# Patient Record
Sex: FEMALE | Race: WHITE | NOT HISPANIC OR LATINO
[De-identification: names, ages, dates, MRNs, and addresses within clinical notes are randomized per-mention and may not be internally consistent; named-entity substitution may affect disease eponyms.]

---

## 2017-01-03 ENCOUNTER — PRIOR ORIGINAL RECORDS (OUTPATIENT)
Dept: OTHER | Age: 67
End: 2017-01-03

## 2017-01-03 ENCOUNTER — HOSPITAL ENCOUNTER (OUTPATIENT)
Dept: MRI IMAGING | Facility: HOSPITAL | Age: 67
Discharge: HOME | End: 2017-01-03
Attending: INTERNAL MEDICINE
Payer: COMMERCIAL

## 2017-01-03 DIAGNOSIS — C79.51 BONE METASTASES (HCC): ICD-10-CM

## 2017-01-03 DIAGNOSIS — C50.211 MALIGNANT NEOPLASM OF UPPER-INNER QUADRANT OF RIGHT FEMALE BREAST (HCC): ICD-10-CM

## 2017-01-03 PROCEDURE — 72157 MRI CHEST SPINE W/O & W/DYE: CPT

## 2017-01-03 PROCEDURE — A9575 INJ GADOTERATE MEGLUMI 0.1ML: HCPCS | Performed by: INTERNAL MEDICINE

## 2017-01-03 PROCEDURE — 72158 MRI LUMBAR SPINE W/O & W/DYE: CPT

## 2017-01-09 ENCOUNTER — PRIOR ORIGINAL RECORDS (OUTPATIENT)
Dept: OTHER | Age: 67
End: 2017-01-09

## 2017-01-16 ENCOUNTER — PRIOR ORIGINAL RECORDS (OUTPATIENT)
Dept: OTHER | Age: 67
End: 2017-01-16

## 2017-01-23 ENCOUNTER — PRIOR ORIGINAL RECORDS (OUTPATIENT)
Dept: OTHER | Age: 67
End: 2017-01-23

## 2017-01-23 ENCOUNTER — APPOINTMENT (OUTPATIENT)
Dept: GENERAL RADIOLOGY | Facility: HOSPITAL | Age: 67
DRG: 597 | End: 2017-01-23
Attending: EMERGENCY MEDICINE
Payer: COMMERCIAL

## 2017-01-23 ENCOUNTER — APPOINTMENT (OUTPATIENT)
Dept: CT IMAGING | Facility: HOSPITAL | Age: 67
DRG: 597 | End: 2017-01-23
Attending: EMERGENCY MEDICINE
Payer: COMMERCIAL

## 2017-01-23 ENCOUNTER — HOSPITAL ENCOUNTER (INPATIENT)
Facility: HOSPITAL | Age: 67
LOS: 2 days | Discharge: HOME | DRG: 597 | End: 2017-01-26
Attending: EMERGENCY MEDICINE | Admitting: FAMILY MEDICINE
Payer: COMMERCIAL

## 2017-01-23 DIAGNOSIS — J18.9 COMMUNITY ACQUIRED PNEUMONIA: Primary | ICD-10-CM

## 2017-01-23 LAB
ALBUMIN SERPL-MCNC: 3.2 G/DL (ref 3.5–4.8)
ALP LIVER SERPL-CCNC: 58 U/L (ref 55–142)
ALT SERPL-CCNC: 49 U/L (ref 14–54)
AST SERPL-CCNC: 42 U/L (ref 15–41)
BASOPHILS # BLD AUTO: 0.02 X10(3) UL (ref 0–0.1)
BASOPHILS NFR BLD AUTO: 0.3 %
BILIRUB SERPL-MCNC: 0.5 MG/DL (ref 0.1–2)
BUN BLD-MCNC: 13 MG/DL (ref 8–20)
CALCIUM BLD-MCNC: 8.4 MG/DL (ref 8.3–10.3)
CHLORIDE: 102 MMOL/L (ref 101–111)
CO2: 25 MMOL/L (ref 22–32)
CREAT BLD-MCNC: 0.75 MG/DL (ref 0.55–1.02)
EOSINOPHIL # BLD AUTO: 0.03 X10(3) UL (ref 0–0.3)
EOSINOPHIL NFR BLD AUTO: 0.4 %
ERYTHROCYTE [DISTWIDTH] IN BLOOD BY AUTOMATED COUNT: 22.5 % (ref 11.5–16)
GLUCOSE BLD-MCNC: 103 MG/DL (ref 70–99)
HCT VFR BLD AUTO: 30.5 % (ref 34–50)
HGB BLD-MCNC: 9.8 G/DL (ref 12–16)
IMMATURE GRANULOCYTE COUNT: 0.05 X10(3) UL (ref 0–1)
IMMATURE GRANULOCYTE RATIO %: 0.7 %
INR BLD: 3 (ref 0.89–1.12)
LYMPHOCYTES # BLD AUTO: 0.52 X10(3) UL (ref 0.9–4)
LYMPHOCYTES NFR BLD AUTO: 7.2 %
M PROTEIN MFR SERPL ELPH: 6.6 G/DL (ref 6.1–8.3)
MCH RBC QN AUTO: 27.6 PG (ref 27–33.2)
MCHC RBC AUTO-ENTMCNC: 32.1 G/DL (ref 31–37)
MCV RBC AUTO: 85.9 FL (ref 81–100)
MONOCYTES # BLD AUTO: 0.66 X10(3) UL (ref 0.1–0.6)
MONOCYTES NFR BLD AUTO: 9.1 %
NEUTROPHIL ABS PRELIM: 5.98 X10 (3) UL (ref 1.3–6.7)
NEUTROPHILS # BLD AUTO: 5.98 X10(3) UL (ref 1.3–6.7)
NEUTROPHILS NFR BLD AUTO: 82.3 %
PLATELET # BLD AUTO: 390 10(3)UL (ref 150–450)
PLATELET MORPHOLOGY: NORMAL
POTASSIUM SERPL-SCNC: 3.2 MMOL/L (ref 3.6–5.1)
PRO-BETA NATRIURETIC PEPTIDE: 7471 PG/ML (ref ?–125)
PSA SERPL DL<=0.01 NG/ML-MCNC: 32.3 SECONDS (ref 12.3–14.8)
RBC # BLD AUTO: 3.55 X10(6)UL (ref 3.8–5.1)
RED CELL DISTRIBUTION WIDTH-SD: 66.3 FL (ref 35.1–46.3)
SODIUM SERPL-SCNC: 137 MMOL/L (ref 136–144)
TROPONIN: <0.046 NG/ML (ref ?–0.05)
WBC # BLD AUTO: 7.3 X10(3) UL (ref 4–13)

## 2017-01-23 PROCEDURE — 71275 CT ANGIOGRAPHY CHEST: CPT

## 2017-01-23 PROCEDURE — 87040 BLOOD CULTURE FOR BACTERIA: CPT | Performed by: EMERGENCY MEDICINE

## 2017-01-23 PROCEDURE — 99285 EMERGENCY DEPT VISIT HI MDM: CPT

## 2017-01-23 PROCEDURE — 96375 TX/PRO/DX INJ NEW DRUG ADDON: CPT

## 2017-01-23 PROCEDURE — 96374 THER/PROPH/DIAG INJ IV PUSH: CPT

## 2017-01-23 PROCEDURE — 93010 ELECTROCARDIOGRAM REPORT: CPT | Performed by: INTERNAL MEDICINE

## 2017-01-23 PROCEDURE — 94645 CONT INHLJ TX EACH ADDL HOUR: CPT

## 2017-01-23 PROCEDURE — 93010 ELECTROCARDIOGRAM REPORT: CPT

## 2017-01-23 PROCEDURE — 93005 ELECTROCARDIOGRAM TRACING: CPT

## 2017-01-23 PROCEDURE — 80053 COMPREHEN METABOLIC PANEL: CPT | Performed by: EMERGENCY MEDICINE

## 2017-01-23 PROCEDURE — 85610 PROTHROMBIN TIME: CPT | Performed by: EMERGENCY MEDICINE

## 2017-01-23 PROCEDURE — 83880 ASSAY OF NATRIURETIC PEPTIDE: CPT | Performed by: EMERGENCY MEDICINE

## 2017-01-23 PROCEDURE — 85025 COMPLETE CBC W/AUTO DIFF WBC: CPT | Performed by: EMERGENCY MEDICINE

## 2017-01-23 PROCEDURE — 84484 ASSAY OF TROPONIN QUANT: CPT | Performed by: EMERGENCY MEDICINE

## 2017-01-23 PROCEDURE — 94644 CONT INHLJ TX 1ST HOUR: CPT

## 2017-01-23 PROCEDURE — 36415 COLL VENOUS BLD VENIPUNCTURE: CPT

## 2017-01-23 PROCEDURE — 71010 XR CHEST AP PORTABLE  (CPT=71010): CPT

## 2017-01-23 RX ORDER — POTASSIUM CHLORIDE 20 MEQ/1
40 TABLET, EXTENDED RELEASE ORAL ONCE
Status: COMPLETED | OUTPATIENT
Start: 2017-01-23 | End: 2017-01-23

## 2017-01-23 RX ORDER — WARFARIN SODIUM 7.5 MG/1
7.5 TABLET ORAL AS DIRECTED
COMMUNITY

## 2017-01-23 RX ORDER — FUROSEMIDE 10 MG/ML
20 INJECTION INTRAMUSCULAR; INTRAVENOUS ONCE
Status: COMPLETED | OUTPATIENT
Start: 2017-01-23 | End: 2017-01-23

## 2017-01-23 RX ORDER — WARFARIN SODIUM 10 MG/1
10 TABLET ORAL
COMMUNITY

## 2017-01-23 RX ORDER — METHYLPREDNISOLONE SODIUM SUCCINATE 125 MG/2ML
125 INJECTION, POWDER, LYOPHILIZED, FOR SOLUTION INTRAMUSCULAR; INTRAVENOUS ONCE
Status: COMPLETED | OUTPATIENT
Start: 2017-01-23 | End: 2017-01-23

## 2017-01-24 ENCOUNTER — APPOINTMENT (OUTPATIENT)
Dept: CV DIAGNOSTICS | Facility: HOSPITAL | Age: 67
DRG: 597 | End: 2017-01-24
Attending: INTERNAL MEDICINE
Payer: COMMERCIAL

## 2017-01-24 LAB
ATRIAL RATE: 105 BPM
ATRIAL RATE: 52 BPM
ATRIAL RATE: 54 BPM
ATRIAL RATE: 56 BPM
BASOPHILS # BLD AUTO: 0.01 X10(3) UL (ref 0–0.1)
BASOPHILS NFR BLD AUTO: 0.1 %
BUN BLD-MCNC: 11 MG/DL (ref 8–20)
CALCIUM BLD-MCNC: 8.4 MG/DL (ref 8.3–10.3)
CHLORIDE: 103 MMOL/L (ref 101–111)
CO2: 24 MMOL/L (ref 22–32)
CREAT BLD-MCNC: 0.89 MG/DL (ref 0.55–1.02)
EOSINOPHIL # BLD AUTO: 0 X10(3) UL (ref 0–0.3)
EOSINOPHIL NFR BLD AUTO: 0 %
ERYTHROCYTE [DISTWIDTH] IN BLOOD BY AUTOMATED COUNT: 22.6 % (ref 11.5–16)
GLUCOSE BLD-MCNC: 157 MG/DL (ref 70–99)
HCT VFR BLD AUTO: 31.2 % (ref 34–50)
HGB BLD-MCNC: 9.8 G/DL (ref 12–16)
IMMATURE GRANULOCYTE COUNT: 0.05 X10(3) UL (ref 0–1)
IMMATURE GRANULOCYTE RATIO %: 0.6 %
INR BLD: 2.77 (ref 0.89–1.12)
LYMPHOCYTES # BLD AUTO: 0.27 X10(3) UL (ref 0.9–4)
LYMPHOCYTES NFR BLD AUTO: 3.1 %
MCH RBC QN AUTO: 27.3 PG (ref 27–33.2)
MCHC RBC AUTO-ENTMCNC: 31.4 G/DL (ref 31–37)
MCV RBC AUTO: 86.9 FL (ref 81–100)
MONOCYTES # BLD AUTO: 0.16 X10(3) UL (ref 0.1–0.6)
MONOCYTES NFR BLD AUTO: 1.8 %
NEUTROPHIL ABS PRELIM: 8.22 X10 (3) UL (ref 1.3–6.7)
NEUTROPHILS # BLD AUTO: 8.22 X10(3) UL (ref 1.3–6.7)
NEUTROPHILS NFR BLD AUTO: 94.4 %
P AXIS: 56 DEGREES
P AXIS: 59 DEGREES
P AXIS: 65 DEGREES
P AXIS: 76 DEGREES
P-R INTERVAL: 128 MS
P-R INTERVAL: 144 MS
P-R INTERVAL: 180 MS
PLATELET # BLD AUTO: 430 10(3)UL (ref 150–450)
POTASSIUM SERPL-SCNC: 3.8 MMOL/L (ref 3.6–5.1)
PRO-BETA NATRIURETIC PEPTIDE: 7082 PG/ML (ref ?–125)
PROCALCITONIN SERPL-MCNC: <0.11 NG/ML (ref ?–0.11)
PSA SERPL DL<=0.01 NG/ML-MCNC: 30.3 SECONDS (ref 12.3–14.8)
Q-T INTERVAL: 580 MS
Q-T INTERVAL: 582 MS
Q-T INTERVAL: 598 MS
Q-T INTERVAL: 604 MS
QRS DURATION: 90 MS
QRS DURATION: 92 MS
QTC CALCULATION (BEZET): 541 MS
QTC CALCULATION (BEZET): 550 MS
QTC CALCULATION (BEZET): 582 MS
QTC CALCULATION (BEZET): 598 MS
R AXIS: 22 DEGREES
R AXIS: 37 DEGREES
R AXIS: 37 DEGREES
R AXIS: 38 DEGREES
RBC # BLD AUTO: 3.59 X10(6)UL (ref 3.8–5.1)
RED CELL DISTRIBUTION WIDTH-SD: 67.2 FL (ref 35.1–46.3)
SODIUM SERPL-SCNC: 138 MMOL/L (ref 136–144)
T AXIS: 40 DEGREES
T AXIS: 55 DEGREES
T AXIS: 60 DEGREES
T AXIS: 66 DEGREES
TROPONIN: <0.046 NG/ML (ref ?–0.05)
VENTRICULAR RATE: 52 BPM
VENTRICULAR RATE: 54 BPM
VENTRICULAR RATE: 56 BPM
VENTRICULAR RATE: 60 BPM
WBC # BLD AUTO: 8.7 X10(3) UL (ref 4–13)

## 2017-01-24 PROCEDURE — 94640 AIRWAY INHALATION TREATMENT: CPT

## 2017-01-24 PROCEDURE — 80299 QUANTITATIVE ASSAY DRUG: CPT | Performed by: INTERNAL MEDICINE

## 2017-01-24 PROCEDURE — 93010 ELECTROCARDIOGRAM REPORT: CPT | Performed by: INTERNAL MEDICINE

## 2017-01-24 PROCEDURE — 87581 M.PNEUMON DNA AMP PROBE: CPT | Performed by: INTERNAL MEDICINE

## 2017-01-24 PROCEDURE — 87449 NOS EACH ORGANISM AG IA: CPT | Performed by: INTERNAL MEDICINE

## 2017-01-24 PROCEDURE — 85025 COMPLETE CBC W/AUTO DIFF WBC: CPT | Performed by: FAMILY MEDICINE

## 2017-01-24 PROCEDURE — 93005 ELECTROCARDIOGRAM TRACING: CPT

## 2017-01-24 PROCEDURE — 94664 DEMO&/EVAL PT USE INHALER: CPT

## 2017-01-24 PROCEDURE — 86738 MYCOPLASMA ANTIBODY: CPT | Performed by: INTERNAL MEDICINE

## 2017-01-24 PROCEDURE — 84145 PROCALCITONIN (PCT): CPT | Performed by: INTERNAL MEDICINE

## 2017-01-24 PROCEDURE — 87999 UNLISTED MICROBIOLOGY PX: CPT

## 2017-01-24 PROCEDURE — 87486 CHLMYD PNEUM DNA AMP PROBE: CPT | Performed by: INTERNAL MEDICINE

## 2017-01-24 PROCEDURE — 84484 ASSAY OF TROPONIN QUANT: CPT | Performed by: INTERNAL MEDICINE

## 2017-01-24 PROCEDURE — 80048 BASIC METABOLIC PNL TOTAL CA: CPT | Performed by: FAMILY MEDICINE

## 2017-01-24 PROCEDURE — 85610 PROTHROMBIN TIME: CPT | Performed by: FAMILY MEDICINE

## 2017-01-24 PROCEDURE — 86713 LEGIONELLA ANTIBODY: CPT | Performed by: INTERNAL MEDICINE

## 2017-01-24 PROCEDURE — 93306 TTE W/DOPPLER COMPLETE: CPT

## 2017-01-24 PROCEDURE — 93306 TTE W/DOPPLER COMPLETE: CPT | Performed by: INTERNAL MEDICINE

## 2017-01-24 PROCEDURE — 87798 DETECT AGENT NOS DNA AMP: CPT | Performed by: INTERNAL MEDICINE

## 2017-01-24 PROCEDURE — 87633 RESP VIRUS 12-25 TARGETS: CPT | Performed by: INTERNAL MEDICINE

## 2017-01-24 PROCEDURE — 83880 ASSAY OF NATRIURETIC PEPTIDE: CPT | Performed by: INTERNAL MEDICINE

## 2017-01-24 RX ORDER — LEVOTHYROXINE SODIUM 0.05 MG/1
50 TABLET ORAL
Status: DISCONTINUED | OUTPATIENT
Start: 2017-01-24 | End: 2017-01-26

## 2017-01-24 RX ORDER — ACETAMINOPHEN 500 MG
500 TABLET ORAL EVERY 6 HOURS PRN
Status: DISCONTINUED | OUTPATIENT
Start: 2017-01-24 | End: 2017-01-26

## 2017-01-24 RX ORDER — IPRATROPIUM BROMIDE AND ALBUTEROL SULFATE 2.5; .5 MG/3ML; MG/3ML
3 SOLUTION RESPIRATORY (INHALATION)
Status: DISCONTINUED | OUTPATIENT
Start: 2017-01-24 | End: 2017-01-26

## 2017-01-24 RX ORDER — HALOPERIDOL 5 MG
5 TABLET ORAL NIGHTLY
Status: DISCONTINUED | OUTPATIENT
Start: 2017-01-24 | End: 2017-01-26

## 2017-01-24 RX ORDER — ONDANSETRON 2 MG/ML
4 INJECTION INTRAMUSCULAR; INTRAVENOUS EVERY 4 HOURS PRN
Status: DISCONTINUED | OUTPATIENT
Start: 2017-01-24 | End: 2017-01-26

## 2017-01-24 RX ORDER — MULTIPLE VITAMINS W/ MINERALS TAB 9MG-400MCG
1 TAB ORAL DAILY
Status: DISCONTINUED | OUTPATIENT
Start: 2017-01-24 | End: 2017-01-26

## 2017-01-24 RX ORDER — BENZTROPINE MESYLATE 0.5 MG/1
1 TABLET ORAL DAILY PRN
Status: DISCONTINUED | OUTPATIENT
Start: 2017-01-24 | End: 2017-01-26

## 2017-01-24 RX ORDER — ACETAMINOPHEN 160 MG
2000 TABLET,DISINTEGRATING ORAL DAILY
Status: DISCONTINUED | OUTPATIENT
Start: 2017-01-24 | End: 2017-01-26

## 2017-01-24 RX ORDER — FUROSEMIDE 10 MG/ML
40 INJECTION INTRAMUSCULAR; INTRAVENOUS ONCE
Status: COMPLETED | OUTPATIENT
Start: 2017-01-24 | End: 2017-01-24

## 2017-01-24 RX ORDER — HALOPERIDOL 5 MG
10 TABLET ORAL NIGHTLY
Status: DISCONTINUED | OUTPATIENT
Start: 2017-01-24 | End: 2017-01-24

## 2017-01-24 NOTE — ED NOTES
Full report given to [address] RN, pt stable at this time. Pt receiving hour long neb treatment. RN informed that zithromax has not yet been infused.

## 2017-01-24 NOTE — PLAN OF CARE
NURSING ADMISSION NOTE      Patient admitted via Cart  Oriented to room. 532  Safety precautions initiated. Bed in low position. Call light in reach.     DISCHARGE PLANNING    • Discharge to home or other facility with appropriate resources Progress

## 2017-01-24 NOTE — ED PROVIDER NOTES
Patient Seen in: [facility] Emergency Department    History   Patient presents with:  Dyspnea LILIANA SOB (respiratory)    Stated Complaint: liliana r/o pe    HPI    This is a 58-year-old female who arrives here with complaints of shortness of breath the pa Monday, Wednesday, Friday   Warfarin Sodium 7.5 MG Oral Tab,  Take 7.5 mg by mouth As Directed. Sunday, Tuesday, Thursday, Saturday   haloperidol (HALDOL) 10 MG Oral Tab,  Take 10 mg by mouth nightly.    acetaminophen (TYLENOL EXTRA STRENGTH) 500 MG Oral Ta Temp(Src) 97.9 °F (36.6 °C) (Oral)  Resp 20  Ht 160 cm (5' 3\")  Wt 100.245 kg  BMI 39.16 kg/m2  SpO2 96%        Physical Exam    General: . The patient is in moderate respiratory distress. HEENT: Atraumatic, conjunctiva are not pale.   There is no i CBC WITH DIFFERENTIAL WITH PLATELET.   Procedure                               Abnormality         Status                     ---------                               -----------         ------                     CBC W/ DIFFERENTIAL[[id number]]          Abno prominence to the interlobular septa in the upper lungs primarily in the differential would include lymphangitic spread of metastases versus CHF and interstitial edema. 2. No evidence of pulmonary embolus.   3. Stable diffuse osteoblastic metastases.

## 2017-01-24 NOTE — PAYOR COMM NOTE
Attending Physician: Shane Hernandez MD    Review Type: ADMISSION   Reviewer: Concetta PRO       Date: January 24, 2017 - 10:16 AM  Payor: [employer] O  Authorization Number: N/A  Admit date: 1/23/2017  6:50 PM   Admitted from Emergency wheezing.              MEDICATIONS ADMINISTERED IN LAST 1 DAY:  albuterol Sulfate (VENTOLIN) (5 MG/ML) 0.5% nebulizer solution 10 mg     Date Action Dose Route User    1/23/2017 2011 New Bag 10 mg Nebulization Mendy Jones    1/23/2017 1915 New Bag 10 mg Ne Action Dose Route User    1/23/2017 2252 Given 40 mEq Oral Cherylene Hitt, RN      Vitamin D3 (VITAMIN D3) cap 2,000 Units     Date Action Dose Route User    1/24/2017 8305 Given 2000 Units Oral Vinh REAGAN Stone          RESULTS LAST 24HRS:  Labs Honorio Gastelum edema. Slight blunting of the left CP angle. CT ANGIOGRAM OF THE CHEST : 1. Compared to the previous study, there is development of moderate-sized bilateral posterior pleural effusions with new passive atelectasis of portions of the lung bases.  There ar

## 2017-01-24 NOTE — CONSULTS
Kolby Aburto [address] [facility]  Pulmonary/Critical Care Consult Note  [facility]  Report of Consultation    Vermaya Garsia Patient Status:  Inpatient    1950 MRN [id number]   [facility] 5NW-A Attending Melissa Chaparro Allergies    Medications:  • haloperidol  10 mg Oral Nightly   • Levothyroxine Sodium  50 mcg Oral Before breakfast   • multivitamin with minerals  1 tablet Oral Daily   • Vitamin D3  2,000 Units Oral Daily   • ipratropium-albuterol  3 mL Nebulization 6 ti 93 % - -   01/23/17 2206 152/74 mmHg - - 61 20 94 % - -   01/23/17 2106 143/64 mmHg - - 54 20 95 % - -   01/23/17 2007 160/75 mmHg - - 57 21 100 % - -   01/23/17 1836 153/83 mmHg 97.8 °F (36.6 °C) Temporal 60 (!) 36 96 % 5' 3\" (1.6 m) 220 lb (99.791 kg) No results found for this visit on 01/23/17. No results for input(s): Yvan Flores, [address], [address], [address], [address], [address] [facility], [address], [address], [address], NITRITE, LEUUR, WBCUR, RBCUR, Shona Facundo in the last 72 hours.     Radiology:     Ct A

## 2017-01-24 NOTE — CONSULTS
[facility]  Report of Consultation    Lyn Espitia Patient Status:  Observation    1950 MRN [id number]   [facility] 5NW-A Attending Ethan Euceda MD   Hosp Day # 1 PCP Vandana Conrad MD     Reason for Consultation:  Rishi Melgar bone.  She was then started on weekly vinorelbine. Her disease was initially stable. Over the last month or 2, there is been a progressive rise in her tumor marker.   A CT scan of the chest abdomen and pelvis in December showed no evidence for soft tissue never used smokeless tobacco. She reports that she does not drink alcohol or use illicit drugs.     Allergies:  No Known Allergies    Medications:    Current facility-administered medications:   •  ondansetron HCl (ZOFRAN) injection 4 mg, 4 mg, Intravenous, This is unchanged from baseline. Muscle strength is 5+/5+ symmetrically   Lymphatics: There is no palpable lymphadenopathy throughout in the cervical,            supraclavicular, axillary, or inguinal regions. Psych/Depression: He is in good spirits.   H

## 2017-01-24 NOTE — ED INITIAL ASSESSMENT (HPI)
Pt c/o progressing SOB since Wednesday. Seen by oncologist today and sent for CT to R/O PE. Denies chest pain, fever.   C/O sob w/ exertion and productive cough

## 2017-01-25 ENCOUNTER — APPOINTMENT (OUTPATIENT)
Dept: ULTRASOUND IMAGING | Facility: HOSPITAL | Age: 67
DRG: 597 | End: 2017-01-25
Attending: INTERNAL MEDICINE
Payer: COMMERCIAL

## 2017-01-25 ENCOUNTER — APPOINTMENT (OUTPATIENT)
Dept: GENERAL RADIOLOGY | Facility: HOSPITAL | Age: 67
DRG: 597 | End: 2017-01-25
Attending: RADIOLOGY
Payer: COMMERCIAL

## 2017-01-25 LAB
ERYTHROCYTE [DISTWIDTH] IN BLOOD BY AUTOMATED COUNT: 22.8 % (ref 11.5–16)
HCT VFR BLD AUTO: 27.2 % (ref 34–50)
HGB BLD-MCNC: 9 G/DL (ref 12–16)
INR BLD: 1.54 (ref 0.89–1.12)
MCH RBC QN AUTO: 28 PG (ref 27–33.2)
MCHC RBC AUTO-ENTMCNC: 33.1 G/DL (ref 31–37)
MCV RBC AUTO: 84.5 FL (ref 81–100)
PLATELET # BLD AUTO: 394 10(3)UL (ref 150–450)
PSA SERPL DL<=0.01 NG/ML-MCNC: 19 SECONDS (ref 12.3–14.8)
RBC # BLD AUTO: 3.22 X10(6)UL (ref 3.8–5.1)
RED CELL DISTRIBUTION WIDTH-SD: 66.8 FL (ref 35.1–46.3)
WBC # BLD AUTO: 9.5 X10(3) UL (ref 4–13)

## 2017-01-25 PROCEDURE — 87205 SMEAR GRAM STAIN: CPT | Performed by: FAMILY MEDICINE

## 2017-01-25 PROCEDURE — 87101 SKIN FUNGI CULTURE: CPT | Performed by: INTERNAL MEDICINE

## 2017-01-25 PROCEDURE — 87206 SMEAR FLUORESCENT/ACID STAI: CPT | Performed by: INTERNAL MEDICINE

## 2017-01-25 PROCEDURE — 88305 TISSUE EXAM BY PATHOLOGIST: CPT | Performed by: FAMILY MEDICINE

## 2017-01-25 PROCEDURE — 94640 AIRWAY INHALATION TREATMENT: CPT

## 2017-01-25 PROCEDURE — 89050 BODY FLUID CELL COUNT: CPT | Performed by: INTERNAL MEDICINE

## 2017-01-25 PROCEDURE — 88342 IMHCHEM/IMCYTCHM 1ST ANTB: CPT | Performed by: FAMILY MEDICINE

## 2017-01-25 PROCEDURE — 87206 SMEAR FLUORESCENT/ACID STAI: CPT | Performed by: FAMILY MEDICINE

## 2017-01-25 PROCEDURE — 83615 LACTATE (LD) (LDH) ENZYME: CPT | Performed by: INTERNAL MEDICINE

## 2017-01-25 PROCEDURE — 87116 MYCOBACTERIA CULTURE: CPT | Performed by: FAMILY MEDICINE

## 2017-01-25 PROCEDURE — 88108 CYTOPATH CONCENTRATE TECH: CPT | Performed by: FAMILY MEDICINE

## 2017-01-25 PROCEDURE — 82945 GLUCOSE OTHER FLUID: CPT | Performed by: INTERNAL MEDICINE

## 2017-01-25 PROCEDURE — 88341 IMHCHEM/IMCYTCHM EA ADD ANTB: CPT | Performed by: FAMILY MEDICINE

## 2017-01-25 PROCEDURE — 71010 XR CHEST AP PORTABLE  (CPT=71010): CPT

## 2017-01-25 PROCEDURE — 84157 ASSAY OF PROTEIN OTHER: CPT | Performed by: INTERNAL MEDICINE

## 2017-01-25 PROCEDURE — 85610 PROTHROMBIN TIME: CPT | Performed by: INTERNAL MEDICINE

## 2017-01-25 PROCEDURE — 85027 COMPLETE CBC AUTOMATED: CPT | Performed by: INTERNAL MEDICINE

## 2017-01-25 PROCEDURE — 0W9B3ZZ DRAINAGE OF LEFT PLEURAL CAVITY, PERCUTANEOUS APPROACH: ICD-10-PCS | Performed by: RADIOLOGY

## 2017-01-25 PROCEDURE — 87070 CULTURE OTHR SPECIMN AEROBIC: CPT | Performed by: FAMILY MEDICINE

## 2017-01-25 PROCEDURE — 32555 ASPIRATE PLEURA W/ IMAGING: CPT

## 2017-01-25 PROCEDURE — 89051 BODY FLUID CELL COUNT: CPT | Performed by: INTERNAL MEDICINE

## 2017-01-25 RX ORDER — ENOXAPARIN SODIUM 100 MG/ML
40 INJECTION SUBCUTANEOUS NIGHTLY
Status: DISCONTINUED | OUTPATIENT
Start: 2017-01-25 | End: 2017-01-26

## 2017-01-25 RX ORDER — FUROSEMIDE 10 MG/ML
20 INJECTION INTRAMUSCULAR; INTRAVENOUS ONCE
Status: COMPLETED | OUTPATIENT
Start: 2017-01-25 | End: 2017-01-25

## 2017-01-25 NOTE — PROCEDURES
[facility]  Procedure Note    Ricarda Gilford Patient Status:  Inpatient    1950 MRN [id number]   [facility] 5NW-A Attending Karen Qiu MD   [facility] Day # 2 PCP Henry Son MD     Procedure: Left thoracentesis    Pre-

## 2017-01-25 NOTE — PROGRESS NOTES
[facility]  Progress Note    Jacqueline Smith Patient Status:  Inpatient    1950 MRN [id number]   [facility] 5NW-A Attending Danny Velazquez MD   [facility] Day # 2 PCP Arron Russell MD     Subjective:  Jacqueline Smith is a(n)

## 2017-01-25 NOTE — PROGRESS NOTES
Received patient back form thora to left lung. Dressing to incision is intact, small amount of old bloody drainage. Lung sounds present.

## 2017-01-25 NOTE — PROGRESS NOTES
01/24/17 2230   Provider Notification   Reason for Communication Order clarification   Provider Name Dr. Zaida Molina of Communication Page   Response Waiting for response;See orders   Pt takes haldol 5 mg at night. 10mg ordered.  Okay to order just

## 2017-01-25 NOTE — CM/SW NOTE
01/25/17 1600   CM/SW Screening   Referral [facility] staff; Chart review;Nursing rounds   Patient's Mental Status Alert;Oriented   Patient lives with Spouse   Patient Status Prior to Admission   Independent with ADLs an

## 2017-01-25 NOTE — PROGRESS NOTES
[facility]  Progress Note    Lenard Dupont Patient Status:  Inpatient    1950 MRN [id number]   [facility] 5NW-A Attending Kishan Rodriguez MD   [facility] Day # 2 PCP Marian Lucio MD     Subjective:  Ketansantana Dupont is a(n) thoracentesis without PTX  Chest CT 1/25- volume overload and moderate bilateral pleural effusions. New reticulonodular opacities in the upper lobes.   +osteoblastic lesions    Medications Reviewed:    Current Facility-Administered Medications:  CefTRIAXon

## 2017-01-25 NOTE — PLAN OF CARE
Pt is A&Ox4. RA. . VSS. Denies pain. Reports some SOB and a productive cough. Meds as ordered. Fluids as TKo through port. Pt is up self to bathroom. All needs met. Pt is resting in bed at this time. Will monitor.

## 2017-01-25 NOTE — PROGRESS NOTES
Pharmacy Note:   Antimicrobial Weight Dose Adjustment for: Rocephin    Nereida Greenberg has been prescribed Rocephin 1 gram IV daily. Estimated Creatinine Clearance: 50.7 mL/min (based on Cr of 0.89).           Weight = 100.4 kg          BMI =

## 2017-01-26 VITALS
RESPIRATION RATE: 16 BRPM | BODY MASS INDEX: 39.21 KG/M2 | WEIGHT: 221.31 LBS | SYSTOLIC BLOOD PRESSURE: 141 MMHG | OXYGEN SATURATION: 95 % | HEART RATE: 110 BPM | HEIGHT: 63 IN | TEMPERATURE: 98 F | DIASTOLIC BLOOD PRESSURE: 85 MMHG

## 2017-01-26 LAB
BASOPHIL PLEURAL FLUID: 0 %
EOSINOPHIL PLEURAL FLUID: 0 %
ERYTHROCYTE [DISTWIDTH] IN BLOOD BY AUTOMATED COUNT: 23 % (ref 11.5–16)
GLUCOSE PLEURAL FLUID: 108 MG/DL
HCT VFR BLD AUTO: 28.4 % (ref 34–50)
HGB BLD-MCNC: 9.4 G/DL (ref 12–16)
LDH PLEURAL FLUID: 122 U/L
LEGIONELLA PNEUMOPHILA AG, URI: NEGATIVE
LYMPHOCYTE PLEURAL FLUID: 24 %
MCH RBC QN AUTO: 28.1 PG (ref 27–33.2)
MCHC RBC AUTO-ENTMCNC: 33.1 G/DL (ref 31–37)
MCV RBC AUTO: 85 FL (ref 81–100)
MESOTHELIAL PLEURAL FLUID: 38 %
MONO/MAC/HISTIO PLEURAL FLUID: 22 %
MYCOPLASMA PNEUMONIAE AB, IGM: 0.06 U/L
NEUTROPHIL PLEURAL FLUID: 16 %
PLATELET # BLD AUTO: 440 10(3)UL (ref 150–450)
RBC # BLD AUTO: 3.34 X10(6)UL (ref 3.8–5.1)
RBC PLEURAL FLUID: <3000 /MM3
RED CELL DISTRIBUTION WIDTH-SD: 67.8 FL (ref 35.1–46.3)
STREPTOCOCCUS PNEUMONIAE AG, U: NEGATIVE
TOTAL CELLS COUNTED: 100
TOTAL PROTEIN PLEURAL FLUID: 2.8 G/DL
WBC # BLD AUTO: 7.2 X10(3) UL (ref 4–13)
WBC PLEURAL FLUID: 994 /MM3

## 2017-01-26 PROCEDURE — 85027 COMPLETE CBC AUTOMATED: CPT | Performed by: INTERNAL MEDICINE

## 2017-01-26 PROCEDURE — 94640 AIRWAY INHALATION TREATMENT: CPT

## 2017-01-26 PROCEDURE — 94664 DEMO&/EVAL PT USE INHALER: CPT

## 2017-01-26 RX ORDER — FUROSEMIDE 10 MG/ML
20 INJECTION INTRAMUSCULAR; INTRAVENOUS DAILY
Status: DISCONTINUED | OUTPATIENT
Start: 2017-01-26 | End: 2017-01-26

## 2017-01-26 RX ORDER — FUROSEMIDE 40 MG/1
40 TABLET ORAL DAILY
Qty: 30 TABLET | Refills: 0 | Status: SHIPPED | OUTPATIENT
Start: 2017-01-26 | End: 2017-04-29

## 2017-01-26 RX ORDER — HALOPERIDOL 5 MG
5 TABLET ORAL NIGHTLY
Qty: 30 TABLET | Refills: 0 | Status: SHIPPED | OUTPATIENT
Start: 2017-01-26

## 2017-01-26 NOTE — PROGRESS NOTES
[facility]  Progress Note    Aileen Puentes Patient Status:  Inpatient    1950 MRN [id number]   [facility] 5NW-A Attending Albaro Mo MD   [facility] Day # 3 PCP Tanya Sanchez MD     Subjective:  Aileen Dhaval is a(n)

## 2017-01-26 NOTE — PLAN OF CARE
DISCHARGE PLANNING    • Discharge to home or other facility with appropriate resources Adequate for Discharge        PAIN - ADULT    • Verbalizes/displays adequate comfort level or patient's stated pain goal Adequate for Discharge        Patient/Family [place]

## 2017-01-26 NOTE — PROGRESS NOTES
[address] Patient Status:  Inpatient    1950 MRN [id number]   [facility] 5NW-A Attending Alyse Sarkar MD   [address] Day # 2 PCP Sim Mcnamara MD       History and Physical      Chief Complaint:  Patient admission:  Warfarin Sodium 10 MG Oral Tab Take 10 mg by mouth 3 (three) times a week. Monday, Wednesday, Friday Disp:  Rfl:  Past Week at Unknown time   Warfarin Sodium 7.5 MG Oral Tab Take 7.5 mg by mouth As Directed.  Sunday, Tuesday, Thursday, Saturday normal. Septum midline. Mucosa normal. No drainage.    Throat: Lips, mucosa, and tongue normal. Teeth and gums normal.   Neck: Supple, symmetrical, trachea midline, no cervical or supraclavicular lymph adenopathy, thyroid: no enlargment/tenderness/nodules a

## 2017-01-26 NOTE — PAYOR COMM NOTE
Attending Physician: Mauri Madrigal MD    Review Type: CONTINUED STAY  Reviewer: Mercedes PRO     Date: January 26, 2017 - 3:09 PM  Payor: [employer] [facility]  Authorization Number: [id number]  Admit date: 1/23/2017  6:50 PM        REVIEWER CO 1/25/2017 2000 Given 3 mL Nebulization Lesly Pereyra    1/25/2017 1526 Given 3 mL Nebulization Angelique HUI          Assessment:  · New onset Dyspnea- Much Improved- DDX includesVO vs PNA vs progression of Metastatic Disease  · Bilateral Pleural Effu

## 2017-01-26 NOTE — PROGRESS NOTES
[facility]  Progress Note    Renuka Walton Patient Status:  Inpatient    1950 MRN [id number]   [facility] 5NW-A Attending Yuliet Rodriguez MD   [address] Day # 3 PCP Betsey Harmon MD     Subjective prominence to the interlobular septa in the upper lungs primarily in the differential would include lymphangitic spread of metastases versus CHF and interstitial edema. 2. No evidence of pulmonary embolus. 3. Stable diffuse osteoblastic metastases.     1/ 1.58cm^2/m^2.    Mean gradient  (S): 8mm Hg. Peak gradient (S): 21mm Hg. Tricuspid valve:   Structurally normal valve.   Leaflet separation was  normal.  Doppler:  Transvalvular velocity was within the normal range. There  was no evidence for stenosis.  Sammie Jackson ([facility]     Breast carcinoma metastatic, bone     Osteoarthrosis, unspecified whether generalized or localized, lower leg     Effusion of lower leg joint     GI bleeding     Community acquired pneumonia      Assessment:  · New onset Dyspnea- Much Improved- D

## 2017-01-27 NOTE — DISCHARGE SUMMARY
[facility]  Discharge Summary    Annie Ellsworth Patient Status:  Inpatient    1950 MRN [id number]   [facility] 5NW-A Attending No att. providers found   Hosp Day # 3 PCP Sidney Cunningham MD     Date of Admission: 2017 midline, no cervical or supraclavicular lymph adenopathy, thyroid: no enlargment/tenderness/nodules appreciated   Lungs  decreeased air entry both lower bases  , no wheezing or crackles heard        Heart:  Regular rate and rhythm, S1, S2 normal, no murmur Thursday, Saturday, Historical    Pantoprazole Sodium (PROTONIX) 40 MG Oral Tab EC  Take 1 tablet by mouth 2 (two) times daily before meals. , Normal, Disp-60 tablet, R-3    acetaminophen (TYLENOL EXTRA STRENGTH) 500 MG Oral Tab  Take 1,000 mg by mouth ever

## 2017-01-30 ENCOUNTER — PRIOR ORIGINAL RECORDS (OUTPATIENT)
Dept: OTHER | Age: 67
End: 2017-01-30

## 2017-02-14 ENCOUNTER — PRIOR ORIGINAL RECORDS (OUTPATIENT)
Dept: OTHER | Age: 67
End: 2017-02-14

## 2017-02-20 ENCOUNTER — PRIOR ORIGINAL RECORDS (OUTPATIENT)
Dept: OTHER | Age: 67
End: 2017-02-20

## 2017-02-27 ENCOUNTER — PRIOR ORIGINAL RECORDS (OUTPATIENT)
Dept: OTHER | Age: 67
End: 2017-02-27

## 2017-03-06 ENCOUNTER — PRIOR ORIGINAL RECORDS (OUTPATIENT)
Dept: OTHER | Age: 67
End: 2017-03-06

## 2017-03-10 ENCOUNTER — HOSPITAL ENCOUNTER (OUTPATIENT)
Dept: NUCLEAR MEDICINE | Facility: HOSPITAL | Age: 67
Discharge: HOME | End: 2017-03-10
Attending: INTERNAL MEDICINE
Payer: COMMERCIAL

## 2017-03-10 DIAGNOSIS — C50.919 BREAST CANCER (HCC): ICD-10-CM

## 2017-03-10 PROCEDURE — 78306 BONE IMAGING WHOLE BODY: CPT

## 2017-03-13 ENCOUNTER — PRIOR ORIGINAL RECORDS (OUTPATIENT)
Dept: OTHER | Age: 67
End: 2017-03-13

## 2017-03-15 ENCOUNTER — HOSPITAL ENCOUNTER (OUTPATIENT)
Dept: GENERAL RADIOLOGY | Age: 67
Discharge: HOME | End: 2017-03-15
Attending: INTERNAL MEDICINE
Payer: COMMERCIAL

## 2017-03-15 DIAGNOSIS — C50.211 BREAST CANCER OF UPPER-INNER QUADRANT OF RIGHT FEMALE BREAST (HCC): ICD-10-CM

## 2017-03-15 PROCEDURE — 71020 XR CHEST PA + LAT CHEST (CPT=71020): CPT

## 2017-03-20 ENCOUNTER — PRIOR ORIGINAL RECORDS (OUTPATIENT)
Dept: OTHER | Age: 67
End: 2017-03-20

## 2017-03-27 ENCOUNTER — PRIOR ORIGINAL RECORDS (OUTPATIENT)
Dept: OTHER | Age: 67
End: 2017-03-27

## 2017-03-31 ENCOUNTER — PRIOR ORIGINAL RECORDS (OUTPATIENT)
Dept: OTHER | Age: 67
End: 2017-03-31

## 2017-04-10 ENCOUNTER — PRIOR ORIGINAL RECORDS (OUTPATIENT)
Dept: OTHER | Age: 67
End: 2017-04-10

## 2017-04-17 ENCOUNTER — PRIOR ORIGINAL RECORDS (OUTPATIENT)
Dept: OTHER | Age: 67
End: 2017-04-17

## 2017-04-24 ENCOUNTER — PRIOR ORIGINAL RECORDS (OUTPATIENT)
Dept: OTHER | Age: 67
End: 2017-04-24

## 2017-04-29 ENCOUNTER — HOSPITAL ENCOUNTER (EMERGENCY)
Facility: HOSPITAL | Age: 67
Discharge: HOME | End: 2017-04-29
Attending: EMERGENCY MEDICINE
Payer: COMMERCIAL

## 2017-04-29 ENCOUNTER — APPOINTMENT (OUTPATIENT)
Dept: GENERAL RADIOLOGY | Facility: HOSPITAL | Age: 67
End: 2017-04-29
Attending: EMERGENCY MEDICINE
Payer: COMMERCIAL

## 2017-04-29 ENCOUNTER — APPOINTMENT (OUTPATIENT)
Dept: ULTRASOUND IMAGING | Facility: HOSPITAL | Age: 67
End: 2017-04-29
Attending: EMERGENCY MEDICINE
Payer: COMMERCIAL

## 2017-04-29 VITALS
BODY MASS INDEX: 36.32 KG/M2 | TEMPERATURE: 97 F | RESPIRATION RATE: 16 BRPM | HEART RATE: 50 BPM | OXYGEN SATURATION: 96 % | WEIGHT: 205 LBS | HEIGHT: 63 IN | SYSTOLIC BLOOD PRESSURE: 127 MMHG | DIASTOLIC BLOOD PRESSURE: 82 MMHG

## 2017-04-29 DIAGNOSIS — M79.662 PAIN OF LEFT CALF: Primary | ICD-10-CM

## 2017-04-29 PROCEDURE — 93971 EXTREMITY STUDY: CPT

## 2017-04-29 PROCEDURE — 73610 X-RAY EXAM OF ANKLE: CPT

## 2017-04-29 PROCEDURE — 99284 EMERGENCY DEPT VISIT MOD MDM: CPT

## 2017-04-29 RX ORDER — DOCETAXEL 10 MG/ML
INJECTION, SOLUTION INTRAVENOUS
COMMUNITY

## 2017-04-29 NOTE — ED NOTES
C/O PAIN TO BOTH HEELS SINCE Thursday. DENIES ANY INJURIES. TODAY NOTED SWELLING TO LT LEG. PAIN IS WORSE WITH WALKING. HAS BEEN TAKING TYLENOL WITH MODERATE RELIEF.

## 2017-04-29 NOTE — ED PROVIDER NOTES
Patient Seen in: [facility] Emergency Department    History   Patient presents with:  Deep Vein Thrombosis (cardiovascular)    Stated Complaint: sent for US for left leg swelling    HPI    78-year-old white female who presents emergency room today f Thursday, Saturday   Pantoprazole Sodium (PROTONIX) 40 MG Oral Tab EC,  Take 1 tablet by mouth 2 (two) times daily before meals. acetaminophen (TYLENOL EXTRA STRENGTH) 500 MG Oral Tab,  Take 1,000 mg by mouth every 4 (four) hours as needed for Pain.    Easton Skelton erythema of the lower extremity. There is some slight swelling noted around the ankle. Minor tenderness is noted along the lateral and medial malleolus. Minimal calf tenderness is noted. Patient can dorsiflex and plantar flex at the ankle without pain. diagnosis)    Disposition:  Discharge    Follow-up:  Mohan Servin N [address] [place] [address] [id number]    In 2 days        Medications Prescribed:  Current Discharge Medication List

## 2017-04-29 NOTE — ED INITIAL ASSESSMENT (HPI)
Patient sent here by oncologist on call for left leg US for pain/swelling to foot and ankle today.  Denies any pain to behind knee or to calf

## 2017-05-04 ENCOUNTER — PRIOR ORIGINAL RECORDS (OUTPATIENT)
Dept: OTHER | Age: 67
End: 2017-05-04

## 2017-05-08 ENCOUNTER — PRIOR ORIGINAL RECORDS (OUTPATIENT)
Dept: OTHER | Age: 67
End: 2017-05-08

## 2017-05-13 ENCOUNTER — HOSPITAL ENCOUNTER (OUTPATIENT)
Dept: CT IMAGING | Facility: HOSPITAL | Age: 67
Discharge: HOME | End: 2017-05-13
Attending: INTERNAL MEDICINE
Payer: COMMERCIAL

## 2017-05-13 DIAGNOSIS — C50.211 MALIGNANT NEOPLASM OF UPPER-INNER QUADRANT OF RIGHT FEMALE BREAST (HCC): ICD-10-CM

## 2017-05-13 DIAGNOSIS — J91.0 MALIGNANT PLEURAL EFFUSION: ICD-10-CM

## 2017-05-13 DIAGNOSIS — C79.51 SECONDARY MALIGNANT NEOPLASM OF BONE (HCC): ICD-10-CM

## 2017-05-13 PROCEDURE — 71260 CT THORAX DX C+: CPT | Performed by: INTERNAL MEDICINE

## 2017-05-13 PROCEDURE — 74177 CT ABD & PELVIS W/CONTRAST: CPT | Performed by: INTERNAL MEDICINE

## 2017-05-15 ENCOUNTER — PRIOR ORIGINAL RECORDS (OUTPATIENT)
Dept: OTHER | Age: 67
End: 2017-05-15

## 2017-05-20 ENCOUNTER — HOSPITAL ENCOUNTER (OUTPATIENT)
Dept: MRI IMAGING | Facility: HOSPITAL | Age: 67
Discharge: HOME | End: 2017-05-20
Attending: INTERNAL MEDICINE
Payer: COMMERCIAL

## 2017-05-20 DIAGNOSIS — C50.211 MALIGNANT NEOPLASM OF UPPER-INNER QUADRANT OF RIGHT FEMALE BREAST (HCC): ICD-10-CM

## 2017-05-20 DIAGNOSIS — C79.51 SECONDARY MALIGNANT NEOPLASM OF BONE (HCC): ICD-10-CM

## 2017-05-20 DIAGNOSIS — J91.0 MALIGNANT PLEURAL EFFUSION: ICD-10-CM

## 2017-05-20 PROCEDURE — A9575 INJ GADOTERATE MEGLUMI 0.1ML: HCPCS | Performed by: INTERNAL MEDICINE

## 2017-05-20 PROCEDURE — 72157 MRI CHEST SPINE W/O & W/DYE: CPT | Performed by: INTERNAL MEDICINE

## 2017-05-20 PROCEDURE — 72158 MRI LUMBAR SPINE W/O & W/DYE: CPT | Performed by: INTERNAL MEDICINE

## 2017-05-22 ENCOUNTER — PRIOR ORIGINAL RECORDS (OUTPATIENT)
Dept: OTHER | Age: 67
End: 2017-05-22

## 2017-06-05 ENCOUNTER — PRIOR ORIGINAL RECORDS (OUTPATIENT)
Dept: OTHER | Age: 67
End: 2017-06-05

## 2017-06-12 ENCOUNTER — PRIOR ORIGINAL RECORDS (OUTPATIENT)
Dept: OTHER | Age: 67
End: 2017-06-12

## 2017-06-19 ENCOUNTER — APPOINTMENT (OUTPATIENT)
Dept: GENERAL RADIOLOGY | Facility: HOSPITAL | Age: 67
DRG: 597 | End: 2017-06-19
Attending: EMERGENCY MEDICINE
Payer: COMMERCIAL

## 2017-06-19 ENCOUNTER — HOSPITAL ENCOUNTER (INPATIENT)
Facility: HOSPITAL | Age: 67
LOS: 3 days | Discharge: HOME | DRG: 597 | End: 2017-06-22
Attending: EMERGENCY MEDICINE | Admitting: FAMILY MEDICINE
Payer: COMMERCIAL

## 2017-06-19 DIAGNOSIS — J18.9 COMMUNITY ACQUIRED PNEUMONIA: Primary | ICD-10-CM

## 2017-06-19 DIAGNOSIS — C50.919 METASTATIC BREAST CANCER (HCC): ICD-10-CM

## 2017-06-19 DIAGNOSIS — J90 PLEURAL EFFUSION: ICD-10-CM

## 2017-06-19 PROBLEM — R73.9 HYPERGLYCEMIA: Status: ACTIVE | Noted: 2017-06-19

## 2017-06-19 PROBLEM — E87.6 HYPOKALEMIA: Status: ACTIVE | Noted: 2017-06-19

## 2017-06-19 PROBLEM — E87.1 HYPONATREMIA: Status: ACTIVE | Noted: 2017-06-19

## 2017-06-19 PROBLEM — D64.9 ANEMIA: Status: ACTIVE | Noted: 2017-06-19

## 2017-06-19 PROCEDURE — 87040 BLOOD CULTURE FOR BACTERIA: CPT | Performed by: EMERGENCY MEDICINE

## 2017-06-19 PROCEDURE — 80299 QUANTITATIVE ASSAY DRUG: CPT | Performed by: INTERNAL MEDICINE

## 2017-06-19 PROCEDURE — 80053 COMPREHEN METABOLIC PANEL: CPT | Performed by: EMERGENCY MEDICINE

## 2017-06-19 PROCEDURE — 99285 EMERGENCY DEPT VISIT HI MDM: CPT

## 2017-06-19 PROCEDURE — 85610 PROTHROMBIN TIME: CPT | Performed by: EMERGENCY MEDICINE

## 2017-06-19 PROCEDURE — 84484 ASSAY OF TROPONIN QUANT: CPT | Performed by: EMERGENCY MEDICINE

## 2017-06-19 PROCEDURE — 87449 NOS EACH ORGANISM AG IA: CPT | Performed by: INTERNAL MEDICINE

## 2017-06-19 PROCEDURE — 83880 ASSAY OF NATRIURETIC PEPTIDE: CPT | Performed by: EMERGENCY MEDICINE

## 2017-06-19 PROCEDURE — 96374 THER/PROPH/DIAG INJ IV PUSH: CPT

## 2017-06-19 PROCEDURE — 71010 XR CHEST AP PORTABLE  (CPT=71010): CPT | Performed by: EMERGENCY MEDICINE

## 2017-06-19 PROCEDURE — 93005 ELECTROCARDIOGRAM TRACING: CPT

## 2017-06-19 PROCEDURE — 36415 COLL VENOUS BLD VENIPUNCTURE: CPT

## 2017-06-19 PROCEDURE — 85730 THROMBOPLASTIN TIME PARTIAL: CPT | Performed by: EMERGENCY MEDICINE

## 2017-06-19 PROCEDURE — 93010 ELECTROCARDIOGRAM REPORT: CPT

## 2017-06-19 PROCEDURE — 85025 COMPLETE CBC W/AUTO DIFF WBC: CPT | Performed by: EMERGENCY MEDICINE

## 2017-06-19 RX ORDER — PANTOPRAZOLE SODIUM 40 MG/1
40 TABLET, DELAYED RELEASE ORAL
Status: DISCONTINUED | OUTPATIENT
Start: 2017-06-19 | End: 2017-06-22

## 2017-06-19 RX ORDER — LEVOTHYROXINE SODIUM 0.05 MG/1
50 TABLET ORAL DAILY
Status: DISCONTINUED | OUTPATIENT
Start: 2017-06-20 | End: 2017-06-22

## 2017-06-19 RX ORDER — IPRATROPIUM BROMIDE AND ALBUTEROL SULFATE 2.5; .5 MG/3ML; MG/3ML
3 SOLUTION RESPIRATORY (INHALATION) EVERY 4 HOURS PRN
Status: DISCONTINUED | OUTPATIENT
Start: 2017-06-19 | End: 2017-06-22

## 2017-06-19 RX ORDER — ZOLPIDEM TARTRATE 5 MG/1
5 TABLET ORAL NIGHTLY PRN
Status: DISCONTINUED | OUTPATIENT
Start: 2017-06-19 | End: 2017-06-22

## 2017-06-19 RX ORDER — GARLIC EXTRACT 500 MG
1 CAPSULE ORAL DAILY
Status: DISCONTINUED | OUTPATIENT
Start: 2017-06-19 | End: 2017-06-22

## 2017-06-19 RX ORDER — ZOLPIDEM TARTRATE 10 MG/1
10 TABLET ORAL NIGHTLY PRN
COMMUNITY

## 2017-06-19 RX ORDER — FUROSEMIDE 10 MG/ML
40 INJECTION INTRAMUSCULAR; INTRAVENOUS ONCE
Status: COMPLETED | OUTPATIENT
Start: 2017-06-19 | End: 2017-06-19

## 2017-06-19 RX ORDER — PANTOPRAZOLE SODIUM 40 MG/1
40 TABLET, DELAYED RELEASE ORAL
Status: DISCONTINUED | OUTPATIENT
Start: 2017-06-20 | End: 2017-06-19

## 2017-06-19 NOTE — PROGRESS NOTES
NURSING ADMISSION NOTE      Patient admitted via Cart  Oriented to room. Safety precautions initiated. Bed in low position. Call light in reach.   Pt admitted from ED aaox4 accompanied by spouse, on O2 3l/nc w/ o2sat 94-96%, rocephin completed, needs

## 2017-06-19 NOTE — H&P
[address] Patient Status:  Inpatient    1950 MRN [id number]   [facility] 4NW-A Attending Ally Bartlett MD   Hosp Day # 0 PCP Mercedes Lopez MD       History and Physical      Chief Complaint:  Patient Family History   Problem Relation Age of Onset   • Cancer Mother    • Breast Cancer Self 60       Allergies:  No Known Allergies    Medications:    Prescriptions prior to admission:  Zolpidem Tartrate 10 MG Oral Tab Take 10 mg by mouth nightly as needed midline. Mucosa normal. No drainage. Throat: Lips, mucosa, and tongue normal.    Neck: Supple, symmetrical,    Lungs:   Decreased air entry both basal regions  No wheezing or crackles heard    Chest wall:  No tenderness or deformity.    Heart:  Regular ra with consolidation of primarily the right lower lobe and portions of the right middle lobe laterally. There is a new left medial retrocardiac opacity suggesting some consolidation with small left effusion. Scoliosis is noted.       6/19/2017  CONCLUSION:  D

## 2017-06-19 NOTE — ED INITIAL ASSESSMENT (HPI)
Pt  Presents to the ED accompanied by family with complaints of shortness of breath for past week. Pt states symptoms getting progressively worse. Pt has lung cancer and is undergoing chemo, last chemo 1 week ago.  Pt awake and alert, skin pale, w/d,resps r

## 2017-06-19 NOTE — ED NOTES
Bedside report given to [facility]. Pt resting comfortably on cart. Spouse at bedside. No questions at this time.

## 2017-06-19 NOTE — ED PROVIDER NOTES
Patient Seen in: [facility] Emergency Department    History   Patient presents with:  Dyspnea LILIANA SOB (respiratory)    Stated Complaint: liliana    HPI    Patient presents with shortness of breath.   The patient states that she started getting short of b Monday, Wednesday, Friday   Warfarin Sodium 7.5 MG Oral Tab,  Take 7.5 mg by mouth As Directed. Sunday, Tuesday, Thursday, Saturday   Pantoprazole Sodium (PROTONIX) 40 MG Oral Tab EC,  Take 1 tablet by mouth 2 (two) times daily before meals.    acetaminophe to light, oropharynx clear, uvula midline. Neck: Supple. Cardiovascular: Regular rate and rhythm. Respiratory: Lungs with bibasilar crackles and diminished on the right.   Abdomen: Soft, nontender, no rebound or guarding, normal active bowel sounds, no C CBC W/ DIFFERENTIAL[[id number]]          Abnormal            Final result                 Please view results for these tests on the individual orders.    URINALYSIS WITH CULTURE REFLEX   RAINBOW DRAW BLUE   RAINBOW DRAW GOLD   RAINBOW DRAW LAVENDER   RAIN Medications   CefTRIAXone Sodium (ROCEPHIN) 1 g in sodium chloride 0.9 % 100 mL IVPB-minibag/addVantage (1 g Intravenous Handoff 6/19/17 7667)   azithromycin (ZITHROMAX) 500 mg in sodium chloride 0.9 % 250 mL IVPB add-vantage (not administered)     The

## 2017-06-19 NOTE — CONSULTS
[facility]  Report of Consultation    Dev Wolfe Patient Status:  Emergency    1950 MRN [id number]   Location [facility] Attending Julisa Zhou MD   Hosp Day # 0 PCP Radha Broussard MD     Reason for Take 1 tablet (5 mg total) by mouth nightly. Warfarin Sodium 10 MG Oral Tab Take 10 mg by mouth 3 (three) times a week. Monday, Wednesday, Friday   Warfarin Sodium 7.5 MG Oral Tab Take 7.5 mg by mouth As Directed.  Sunday, Tuesday, Thursday, Saturday   Pa non-distended, no masses, no guarding, no     rebound, positive BS. Extremity: no edema, no cyanosis   Skin: No rashes or lesions.    Neurological: Alert, interactive, no focal deficit    Vital signs in last 24 hours:  Patient Vitals for the past 24 hrs: , sent Legion/Strep/Myco   · Will assess AM - if no respond to ABX - will proceed with FFP and tap effusion , otherwise can let INR drift and tap it little taller   · If no improvement consider ECHO  - to reassure absence of pericard effusion   · Continue

## 2017-06-20 ENCOUNTER — APPOINTMENT (OUTPATIENT)
Dept: GENERAL RADIOLOGY | Facility: HOSPITAL | Age: 67
DRG: 597 | End: 2017-06-20
Attending: INTERNAL MEDICINE
Payer: COMMERCIAL

## 2017-06-20 ENCOUNTER — APPOINTMENT (OUTPATIENT)
Dept: CV DIAGNOSTICS | Facility: HOSPITAL | Age: 67
DRG: 597 | End: 2017-06-20
Attending: INTERNAL MEDICINE
Payer: COMMERCIAL

## 2017-06-20 ENCOUNTER — APPOINTMENT (OUTPATIENT)
Dept: ULTRASOUND IMAGING | Facility: HOSPITAL | Age: 67
DRG: 597 | End: 2017-06-20
Attending: INTERNAL MEDICINE
Payer: COMMERCIAL

## 2017-06-20 PROCEDURE — 83615 LACTATE (LD) (LDH) ENZYME: CPT | Performed by: INTERNAL MEDICINE

## 2017-06-20 PROCEDURE — 88108 CYTOPATH CONCENTRATE TECH: CPT | Performed by: INTERNAL MEDICINE

## 2017-06-20 PROCEDURE — 84132 ASSAY OF SERUM POTASSIUM: CPT | Performed by: FAMILY MEDICINE

## 2017-06-20 PROCEDURE — 93307 TTE W/O DOPPLER COMPLETE: CPT | Performed by: INTERNAL MEDICINE

## 2017-06-20 PROCEDURE — 82945 GLUCOSE OTHER FLUID: CPT | Performed by: INTERNAL MEDICINE

## 2017-06-20 PROCEDURE — 85610 PROTHROMBIN TIME: CPT | Performed by: INTERNAL MEDICINE

## 2017-06-20 PROCEDURE — 71010 XR CHEST AP PORTABLE  (CPT=71010): CPT | Performed by: INTERNAL MEDICINE

## 2017-06-20 PROCEDURE — 83735 ASSAY OF MAGNESIUM: CPT | Performed by: INTERNAL MEDICINE

## 2017-06-20 PROCEDURE — 89051 BODY FLUID CELL COUNT: CPT | Performed by: INTERNAL MEDICINE

## 2017-06-20 PROCEDURE — 86738 MYCOPLASMA ANTIBODY: CPT | Performed by: INTERNAL MEDICINE

## 2017-06-20 PROCEDURE — 84100 ASSAY OF PHOSPHORUS: CPT | Performed by: INTERNAL MEDICINE

## 2017-06-20 PROCEDURE — 84145 PROCALCITONIN (PCT): CPT | Performed by: INTERNAL MEDICINE

## 2017-06-20 PROCEDURE — 32555 ASPIRATE PLEURA W/ IMAGING: CPT | Performed by: INTERNAL MEDICINE

## 2017-06-20 PROCEDURE — 0W9B3ZZ DRAINAGE OF LEFT PLEURAL CAVITY, PERCUTANEOUS APPROACH: ICD-10-PCS | Performed by: RADIOLOGY

## 2017-06-20 PROCEDURE — 84157 ASSAY OF PROTEIN OTHER: CPT | Performed by: INTERNAL MEDICINE

## 2017-06-20 PROCEDURE — 88305 TISSUE EXAM BY PATHOLOGIST: CPT | Performed by: INTERNAL MEDICINE

## 2017-06-20 PROCEDURE — 85025 COMPLETE CBC W/AUTO DIFF WBC: CPT | Performed by: INTERNAL MEDICINE

## 2017-06-20 PROCEDURE — 80048 BASIC METABOLIC PNL TOTAL CA: CPT | Performed by: INTERNAL MEDICINE

## 2017-06-20 PROCEDURE — 87070 CULTURE OTHR SPECIMN AEROBIC: CPT | Performed by: INTERNAL MEDICINE

## 2017-06-20 PROCEDURE — 87205 SMEAR GRAM STAIN: CPT | Performed by: INTERNAL MEDICINE

## 2017-06-20 PROCEDURE — 89050 BODY FLUID CELL COUNT: CPT | Performed by: INTERNAL MEDICINE

## 2017-06-20 PROCEDURE — 82800 BLOOD PH: CPT | Performed by: INTERNAL MEDICINE

## 2017-06-20 PROCEDURE — 84157 ASSAY OF PROTEIN OTHER: CPT | Performed by: FAMILY MEDICINE

## 2017-06-20 PROCEDURE — 84443 ASSAY THYROID STIM HORMONE: CPT | Performed by: FAMILY MEDICINE

## 2017-06-20 PROCEDURE — 0W993ZZ DRAINAGE OF RIGHT PLEURAL CAVITY, PERCUTANEOUS APPROACH: ICD-10-PCS | Performed by: RADIOLOGY

## 2017-06-20 PROCEDURE — 88342 IMHCHEM/IMCYTCHM 1ST ANTB: CPT | Performed by: INTERNAL MEDICINE

## 2017-06-20 PROCEDURE — 88341 IMHCHEM/IMCYTCHM EA ADD ANTB: CPT | Performed by: INTERNAL MEDICINE

## 2017-06-20 RX ORDER — POTASSIUM CHLORIDE 14.9 MG/ML
20 INJECTION INTRAVENOUS ONCE
Status: COMPLETED | OUTPATIENT
Start: 2017-06-20 | End: 2017-06-20

## 2017-06-20 RX ORDER — POTASSIUM CHLORIDE 29.8 MG/ML
40 INJECTION INTRAVENOUS ONCE
Status: COMPLETED | OUTPATIENT
Start: 2017-06-20 | End: 2017-06-20

## 2017-06-20 RX ORDER — POTASSIUM CHLORIDE 20 MEQ/1
40 TABLET, EXTENDED RELEASE ORAL ONCE
Status: COMPLETED | OUTPATIENT
Start: 2017-06-20 | End: 2017-06-20

## 2017-06-20 RX ORDER — MAGNESIUM OXIDE 400 MG (241.3 MG MAGNESIUM) TABLET
400 TABLET ONCE
Status: COMPLETED | OUTPATIENT
Start: 2017-06-20 | End: 2017-06-20

## 2017-06-20 NOTE — CM/SW NOTE
SW lives at home w/spouse and has breast CA w/mets. SW received an order for a SW consult. Attempted to w/pt. Pt away at an ultrasound. SW to assess at a later time.

## 2017-06-20 NOTE — PLAN OF CARE
METABOLIC/FLUID AND ELECTROLYTES - ADULT    • Electrolytes maintained within normal limits Progressing    • Hemodynamic stability and optimal renal function maintained Progressing        Patient/Family Goals    • Patient/Family Long Term Goal Progressing

## 2017-06-20 NOTE — IMAGING NOTE
Pt to come down tomorrow for left sided thoracentesis per [address] Notified RN [facility] Gene to keep pt NPO after midnight, and have labs drawn in AM, (PT/INR already ordered).  Plan to do approximately 12:30-1:00 pm. Pt last dose of coumadin was

## 2017-06-20 NOTE — PROGRESS NOTES
[facility]  Progress Note    Yury Dimas Patient Status:  Inpatient    1950 MRN [id number]   [facility] 4NW-A Attending Ariadne Garcia MD   Hosp Day # 1 PCP Denise Joiner MD     Subjective pneumothorax    5/2017 CT CHest:  FINDINGS:     LUNGS:  Mild centrilobular emphysematous changes are noted. A dominant nodule, mass or consolidation is not identified. MEDIASTINUM:  No significant thoracic lymphadenopathy.   CARDIAC:  No pericardial effusi all  · Large Right Pleural Effusion- Suspect Malignant  · Left MPE s/p Thora on 1/2017  · DVT/ PE on coumadin w/ subtherapeutic INR  · Stable Echo 1/2017 w/ EF 60%  · Trace Pericardial Fluid on 1/2017 scan    Plan:    · Empiric abx- Zosyn  · F/u titers  ·

## 2017-06-20 NOTE — CONSULTS
[facility]  Report of Consultation    Mike Shoulders Patient Status:  Inpatient    1950 MRN [id number]   [facility] 4NW-A Attending Honey Freed MD   Hosp Day # 1 PCP Alessandro Jaime MD     Reason for Consultation:  Met stable disease, she again had a rising tumor marker. Follow-up bone scans demonstrated new bony metastases. In January, she was hospitalized with increasing shortness of breath. Bilateral pleural effusions were noted.   She underwent a left thoracentesis tobacco. She reports that she does not drink alcohol or use illicit drugs.     Allergies:  No Known Allergies    Medications:    Current facility-administered medications:   •  magnesium oxide (MAG-OX) tab 400 mg, 400 mg, Oral, Once  •  potassium chloride I edema. Neurological: Grossly intact. Lymphatics: There is no palpable lymphadenopathy throughout in the cervical,            supraclavicular, axillary, or inguinal regions. Psych/Depression: She is in good spirits.   Performance Status:  1    Laborat

## 2017-06-20 NOTE — PROCEDURES
Thoracentesis -   RIGHT side With US guidance   Consent: Informed consent was obtained. Risks of the procedure were discussed including: infection, bleeding, pain, pneumothorax. Under sterile conditions the patient was positioned.  Betadine solution and s

## 2017-06-20 NOTE — PLAN OF CARE
RESPIRATORY - ADULT    • Achieves optimal ventilation and oxygenation Progressing          VSS and afebrile. Lung sounds clear/dim. On 3L o2 NC, sats in high 90's. Complaints of dyspnea on exertion. Possible thoracentesis tomorrow. Coumadin on hold.

## 2017-06-20 NOTE — PAYOR COMM NOTE
--------------  ADMISSION REVIEW     Payor: [employer]  Subscriber #:  [id number]  Authorization Number: N/A    Admit date: 6/19/2017 12:43 PM       Admitting Physician: Mimi Cortes MD  Attending Physician:  Mimi Cortes MD  P 114 (*)     Calcium, Total 7.9 (*)     Alkaline Phosphatase 45 (*)     Total Protein 5.8 (*)     Albumin 2.8 (*)     Sodium 129 (*)     Potassium 3.5 (*)     Chloride 94 (*)     All other components within normal limits   PROTHROMBIN TIME (PT) - Abnormal; Problems:    Hyponatremia    Hypokalemia    Anemia    Hyperglycemia    Metastatic breast cancer (HCC)    Pleural effusion    Metastatic Breast ca with malignant plueral effusion s/p left thoracentesis 01/17  New onset dyspnea  Multifactorial  PNA/ recurren

## 2017-06-20 NOTE — PROGRESS NOTES
[address] Patient Status:  Inpatient    1950 MRN [id number]   [facility] 4NW-A Attending Ariadne Garcia MD   Hosp Day # 1 PCP MD Peter Vann Pantoprazole Sodium (PROTONIX) EC tab 40 mg 40 mg Oral BID AC   TBO-filgrastim (GRANIX) 480 MCG/0.8ML syringe SOSY 480 mcg 480 mcg Subcutaneous [email or web address]   azithromycin (ZITHROMAX) 500 mg in sodium chloride 0.9 % 250 mL IVPB add-vantage 500 mg Intraveno

## 2017-06-21 ENCOUNTER — APPOINTMENT (OUTPATIENT)
Dept: GENERAL RADIOLOGY | Facility: HOSPITAL | Age: 67
DRG: 597 | End: 2017-06-21
Attending: RADIOLOGY
Payer: COMMERCIAL

## 2017-06-21 ENCOUNTER — APPOINTMENT (OUTPATIENT)
Dept: CT IMAGING | Facility: HOSPITAL | Age: 67
DRG: 597 | End: 2017-06-21
Attending: INTERNAL MEDICINE
Payer: COMMERCIAL

## 2017-06-21 ENCOUNTER — APPOINTMENT (OUTPATIENT)
Dept: ULTRASOUND IMAGING | Facility: HOSPITAL | Age: 67
DRG: 597 | End: 2017-06-21
Attending: INTERNAL MEDICINE
Payer: COMMERCIAL

## 2017-06-21 PROCEDURE — 83735 ASSAY OF MAGNESIUM: CPT | Performed by: FAMILY MEDICINE

## 2017-06-21 PROCEDURE — 83615 LACTATE (LD) (LDH) ENZYME: CPT | Performed by: INTERNAL MEDICINE

## 2017-06-21 PROCEDURE — 32555 ASPIRATE PLEURA W/ IMAGING: CPT | Performed by: INTERNAL MEDICINE

## 2017-06-21 PROCEDURE — 85610 PROTHROMBIN TIME: CPT | Performed by: INTERNAL MEDICINE

## 2017-06-21 PROCEDURE — 71260 CT THORAX DX C+: CPT | Performed by: INTERNAL MEDICINE

## 2017-06-21 PROCEDURE — 85027 COMPLETE CBC AUTOMATED: CPT | Performed by: FAMILY MEDICINE

## 2017-06-21 PROCEDURE — 84132 ASSAY OF SERUM POTASSIUM: CPT | Performed by: FAMILY MEDICINE

## 2017-06-21 PROCEDURE — 71010 XR CHEST AP/PA (1 VIEW) (CPT=71010): CPT | Performed by: RADIOLOGY

## 2017-06-21 RX ORDER — ENOXAPARIN SODIUM 100 MG/ML
40 INJECTION SUBCUTANEOUS DAILY
Status: DISCONTINUED | OUTPATIENT
Start: 2017-06-21 | End: 2017-06-22

## 2017-06-21 NOTE — PROGRESS NOTES
[address] Patient Status:  Inpatient    1950 MRN [id number]   [facility] 4NW-A Attending Alyse Sarkar MD   Hosp Day # 2 PCP MD Peter Montemayor Cera MG/0.4ML injection 40 mg 40 mg Subcutaneous Daily   ipratropium-albuterol (DUONEB) nebulizer solution 3 mL 3 mL Nebulization Q4H PRN   Acidophilus/Pectin (PROBIOTIC) CAPS 1 capsule 1 capsule Oral Daily   Piperacillin Sod-Tazobactam So (ZOSYN) 3.375 g in de

## 2017-06-21 NOTE — PROGRESS NOTES
[facility]  Progress Note    Lorrie Young Patient Status:  Inpatient    1950 MRN [id number]   [facility] 4NW-A Attending Wanda Moser MD   [facility] Day # 2 PCP Charles Cage MD     Subjective:  Lorrie Young is a(n) Assessment and Plan:   Patient Active Problem List:     Metastatic cancer to spine ([address]     Breast cancer ([address]     Hypertension     Hypothyroidism     Bipolar disorder ([address]     Deep vein thrombosis (DVT) ([address]     Pulmonary embolism ([address]     Breast

## 2017-06-21 NOTE — PROCEDURES
[facility]  Procedure Note    Rosaline Ayoub Patient Status:  Inpatient    1950 MRN [id number]   [facility] 4NW-A Attending Tan Singletary MD   [facility] Day # 2 PCP Coty Uriostegui MD     Procedure: US guided left thoracentes

## 2017-06-21 NOTE — IMAGING NOTE
Pt down in department for Left sided thoracentesis per Dr Berry Muller. Pt tolerated procedure well and 600 ml fluid drained. No sample to lab. Post procedure vitals stable and bandaid to LL posterior back is dry and intact.  Pt taken to CT where her implanted po

## 2017-06-21 NOTE — PROGRESS NOTES
[facility]  Progress Note    She Connelly Patient Status:  Inpatient    1950 MRN [id number]   [facility] 4NW-A Attending Fermin Segura MD   [facility] Day # 2 PCP Adela Rivas MD     Subjective:  She Connelly is a(n)

## 2017-06-22 VITALS
RESPIRATION RATE: 20 BRPM | HEIGHT: 63 IN | TEMPERATURE: 98 F | HEART RATE: 53 BPM | BODY MASS INDEX: 37.71 KG/M2 | SYSTOLIC BLOOD PRESSURE: 100 MMHG | DIASTOLIC BLOOD PRESSURE: 49 MMHG | OXYGEN SATURATION: 98 % | WEIGHT: 212.81 LBS

## 2017-06-22 PROCEDURE — 85025 COMPLETE CBC W/AUTO DIFF WBC: CPT | Performed by: INTERNAL MEDICINE

## 2017-06-22 PROCEDURE — 85610 PROTHROMBIN TIME: CPT | Performed by: INTERNAL MEDICINE

## 2017-06-22 PROCEDURE — 97110 THERAPEUTIC EXERCISES: CPT

## 2017-06-22 PROCEDURE — 83735 ASSAY OF MAGNESIUM: CPT | Performed by: FAMILY MEDICINE

## 2017-06-22 PROCEDURE — 85027 COMPLETE CBC AUTOMATED: CPT | Performed by: INTERNAL MEDICINE

## 2017-06-22 PROCEDURE — 85007 BL SMEAR W/DIFF WBC COUNT: CPT | Performed by: INTERNAL MEDICINE

## 2017-06-22 PROCEDURE — 97162 PT EVAL MOD COMPLEX 30 MIN: CPT

## 2017-06-22 RX ORDER — AMOXICILLIN AND CLAVULANATE POTASSIUM 875; 125 MG/1; MG/1
1 TABLET, FILM COATED ORAL 2 TIMES DAILY
Qty: 20 TABLET | Refills: 0 | Status: SHIPPED | OUTPATIENT
Start: 2017-06-22 | End: 2017-07-02

## 2017-06-22 RX ORDER — ACETAMINOPHEN 325 MG/1
650 TABLET ORAL EVERY 6 HOURS PRN
Status: DISCONTINUED | OUTPATIENT
Start: 2017-06-22 | End: 2017-06-22

## 2017-06-22 RX ORDER — AZITHROMYCIN 250 MG/1
250 TABLET, FILM COATED ORAL DAILY
Qty: 3 TABLET | Refills: 0 | Status: SHIPPED | OUTPATIENT
Start: 2017-06-22 | End: 2017-07-12

## 2017-06-22 NOTE — PROGRESS NOTES
[facility]  Progress Note    Chhayarachel Henry Patient Status:  Inpatient    1950 MRN [id number]   [facility] 4NW-A Attending Mauri Madrigal MD   [facility] Day # 3 PCP Mahin Munson MD     Subjective:  Chhaya Henry is a(n) chest x-ray was reviewed        Medications reviewed     Assessment and Plan:   Patient Active Problem List:     Metastatic cancer to spine ([facility]     Breast cancer ([facility]     Hypertension     Hypothyroidism     Bipolar disorder ([facility]     Deep vein thrombosi

## 2017-06-22 NOTE — PROGRESS NOTES
NURSING DISCHARGE NOTE    Discharged Home via Wheelchair. Accompanied by Friend  Belongings Taken by patient/family.

## 2017-06-22 NOTE — CM/SW NOTE
06/22/17 1500   CM/SW Referral Data   Referral Source Physician   Reason for Referral Discharge planning   Informant Patient   Patient Info   Patient's Mental Status Alert;Oriented   Number of Levels in Home 1   Patient lives with Spouse; Children   Vivienne

## 2017-06-22 NOTE — PAYOR COMM NOTE
--------------  CONTINUED STAY REVIEW      6/21    FEELS LESS DYSPENIC  S/P LEFT THORACENTESIS TODAY ABOUT  600 ML OF FLUID REMOVED        :  /50 mmHg  Pulse 58  Temp(Src) 98.2 °F (36.8 °C) (Oral)  Resp 20  Ht 5' 3\" (1.6 m)  Wt 212 lb 12.8 oz (96.52

## 2017-06-22 NOTE — PHYSICAL THERAPY NOTE
PHYSICAL THERAPY EVALUATION - INPATIENT     Room Number: 430/430-A  Evaluation Date: 6/22/2017  Type of Evaluation: Initial  Physician Order: PT Eval and Treat    Presenting Problem: dyspnea, malignant effusion, PNA, s/p thoracentesis  Reason for ABBIE R [facility] None  Patient Regularly Uses: None    Prior Level of Mobile: ambulates independently without a device, indep with ADLs. Lives with  who works during day. SUBJECTIVE  \"I'm feeling better since they took that fluid off my lungs. \"    Tj Jones (G-Code): CJ    FUNCTIONAL ABILITY STATUS  Gait Assessment   Gait Assistance: Minimum assistance (CGA)  Distance (ft): 150  Assistive Device: Other (Comment) (IV pole)  Pattern:  (decr finn, slightly unsteady)  Stoop/Curb Assistance: Not tested       Sk will ensure that she's compliant and progressing. DISCHARGE RECOMMENDATIONS  PT Discharge Recommendations: Home    PLAN  PT Treatment Plan: Endurance; Patient education;Gait training;Strengthening;Transfer training;Balance training  Rehab Potential : Goo

## 2017-06-22 NOTE — PROGRESS NOTES
[facility]  Progress Note    Gagandeep Hines Patient Status:  Inpatient    1950 MRN [id number]   [facility] 4NW-A Attending Mimi Cortes MD   [address] Day # 3 PCP Mahesh Manrique MD     Subjective:  Gagandeep Hines is a(n)

## 2017-06-25 NOTE — DISCHARGE SUMMARY
[facility]  Discharge Summary    Gagandeep Hines Patient Status:  Inpatient    1950 MRN [id number]   [facility] 4NW-A Attending No att. providers found   [facility] Day # 3 PCP Mahesh Manrique MD     Date of Admission: 2017 rising tumor marker was noted. Initially, CT scans were unrevealing. In late 2013, a PET scan demonstrated multiple bony metastases.   She received radiation therapy to the thoracic spine and then was started on salvage hormonal treatment with everolimus deformity. Heart:  Regular rate and rhythm, S1, S2 normal, no murmur, rub or gallop appreciated   Abdomen:   Soft, non-tender. Bowel sounds normal. No masses,  No organomegaly.  Non distended   Extremities: Extremities normal, atraumatic, no cyanosis or e cc of clear yellow fluid drained on 06/21  Ultrasound-guided right thoracenteses 06/20 1600 mL of yellow pleural fluid was obtained fluid was sent for culture pH chemistries and cytology    Complications: none    Disposition: Home or Self Care    Discharge mouth daily. , Historical    !! - Potential duplicate medications found. Please discuss with provider. Follow up Visits:  Follow-up with Dr Denise Grace  in 4 days on Monday         Dr Nela White as advised

## 2017-06-26 NOTE — CM/SW NOTE
06/26/17 1000   Discharge disposition   Discharged to: Home or Self   Discharge transportation Private car

## 2017-07-10 ENCOUNTER — PRIOR ORIGINAL RECORDS (OUTPATIENT)
Dept: OTHER | Age: 67
End: 2017-07-10

## 2017-07-10 ENCOUNTER — HOSPITAL ENCOUNTER (OUTPATIENT)
Dept: GENERAL RADIOLOGY | Age: 67
Discharge: HOME | End: 2017-07-10
Attending: INTERNAL MEDICINE
Payer: COMMERCIAL

## 2017-07-10 DIAGNOSIS — J18.9 COMMUNITY ACQUIRED PNEUMONIA: ICD-10-CM

## 2017-07-10 PROCEDURE — 71020 XR CHEST PA + LAT CHEST (CPT=71020): CPT | Performed by: INTERNAL MEDICINE

## 2017-07-12 RX ORDER — FUROSEMIDE 20 MG/1
20 TABLET ORAL DAILY
COMMUNITY

## 2017-07-17 ENCOUNTER — HOSPITAL ENCOUNTER (OUTPATIENT)
Dept: INTERVENTIONAL RADIOLOGY/VASCULAR | Facility: HOSPITAL | Age: 67
Discharge: HOME | End: 2017-07-17
Attending: INTERNAL MEDICINE | Admitting: INTERNAL MEDICINE
Payer: COMMERCIAL

## 2017-07-17 ENCOUNTER — PRIOR ORIGINAL RECORDS (OUTPATIENT)
Dept: OTHER | Age: 67
End: 2017-07-17

## 2017-07-17 VITALS
RESPIRATION RATE: 18 BRPM | WEIGHT: 201 LBS | DIASTOLIC BLOOD PRESSURE: 57 MMHG | OXYGEN SATURATION: 91 % | HEART RATE: 100 BPM | SYSTOLIC BLOOD PRESSURE: 84 MMHG | HEIGHT: 63 IN | BODY MASS INDEX: 35.61 KG/M2

## 2017-07-17 DIAGNOSIS — J91.0 MALIGNANT PLEURAL EFFUSION: ICD-10-CM

## 2017-07-17 DIAGNOSIS — C79.51 SECONDARY MALIGNANT NEOPLASM OF BONE (HCC): ICD-10-CM

## 2017-07-17 DIAGNOSIS — C50.211 MALIGNANT NEOPLASM OF UPPER-INNER QUADRANT OF RIGHT FEMALE BREAST (HCC): ICD-10-CM

## 2017-07-17 LAB
BASOPHILS # BLD AUTO: 0.01 X10(3) UL (ref 0–0.1)
BASOPHILS NFR BLD AUTO: 0.8 %
EOSINOPHIL # BLD AUTO: 0.03 X10(3) UL (ref 0–0.3)
EOSINOPHIL NFR BLD AUTO: 2.5 %
ERYTHROCYTE [DISTWIDTH] IN BLOOD BY AUTOMATED COUNT: 21.6 % (ref 11.5–16)
HCT VFR BLD AUTO: 35.4 % (ref 34–50)
HGB BLD-MCNC: 11.6 G/DL (ref 12–16)
IMMATURE GRANULOCYTE COUNT: 0.01 X10(3) UL (ref 0–1)
IMMATURE GRANULOCYTE RATIO %: 0.8 %
INR: 1.1 (ref 0.8–1.3)
LYMPHOCYTES # BLD AUTO: 0.34 X10(3) UL (ref 0.9–4)
LYMPHOCYTES NFR BLD AUTO: 28.8 %
MCH RBC QN AUTO: 27.3 PG (ref 27–33.2)
MCHC RBC AUTO-ENTMCNC: 32.8 G/DL (ref 31–37)
MCV RBC AUTO: 83.3 FL (ref 81–100)
MONOCYTES # BLD AUTO: 0.1 X10(3) UL (ref 0.1–0.6)
MONOCYTES NFR BLD AUTO: 8.5 %
NEUTROPHIL ABS PRELIM: 0.69 X10 (3) UL (ref 1.3–6.7)
NEUTROPHILS # BLD AUTO: 0.69 X10(3) UL (ref 1.3–6.7)
NEUTROPHILS NFR BLD AUTO: 58.6 %
PLATELET # BLD AUTO: 295 10(3)UL (ref 150–450)
RBC # BLD AUTO: 4.25 X10(6)UL (ref 3.8–5.1)
RED CELL DISTRIBUTION WIDTH-SD: 64.3 FL (ref 35.1–46.3)
WBC # BLD AUTO: 1.2 X10(3) UL (ref 4–13)

## 2017-07-17 PROCEDURE — 0B9N30Z DRAINAGE OF RIGHT PLEURA WITH DRAINAGE DEVICE, PERCUTANEOUS APPROACH: ICD-10-PCS | Performed by: RADIOLOGY

## 2017-07-17 PROCEDURE — 85025 COMPLETE CBC W/AUTO DIFF WBC: CPT | Performed by: INTERNAL MEDICINE

## 2017-07-17 PROCEDURE — 32557 INSERT CATH PLEURA W/ IMAGE: CPT

## 2017-07-17 PROCEDURE — 99152 MOD SED SAME PHYS/QHP 5/>YRS: CPT

## 2017-07-17 RX ORDER — MIDAZOLAM HYDROCHLORIDE 1 MG/ML
INJECTION INTRAMUSCULAR; INTRAVENOUS
Status: COMPLETED
Start: 2017-07-17 | End: 2017-07-17

## 2017-07-17 RX ORDER — LIDOCAINE HYDROCHLORIDE AND EPINEPHRINE 10; 10 MG/ML; UG/ML
INJECTION, SOLUTION INFILTRATION; PERINEURAL
Status: COMPLETED
Start: 2017-07-17 | End: 2017-07-17

## 2017-07-17 RX ORDER — LIDOCAINE HYDROCHLORIDE 10 MG/ML
INJECTION, SOLUTION INFILTRATION; PERINEURAL
Status: COMPLETED
Start: 2017-07-17 | End: 2017-07-17

## 2017-07-17 RX ORDER — CEFAZOLIN SODIUM 1 G/3ML
INJECTION, POWDER, FOR SOLUTION INTRAMUSCULAR; INTRAVENOUS
Status: COMPLETED
Start: 2017-07-17 | End: 2017-07-17

## 2017-07-17 RX ORDER — SODIUM CHLORIDE 9 MG/ML
INJECTION, SOLUTION INTRAVENOUS CONTINUOUS
Status: DISCONTINUED | OUTPATIENT
Start: 2017-07-17 | End: 2017-07-17

## 2017-07-17 NOTE — PROGRESS NOTES
Returns from IR for pleurex cath insertion. Sats in high 80s, told her to take some deep breaths, and gave o2 at 2L NC. No complaints, family at bedside.

## 2017-07-17 NOTE — PROGRESS NOTES
Discharged to home, family is driving, all instructions given, patient and family watched the educational video about the pleurex catheter drainage. Appointment with oncology doctor this afternoon for further instructions. Ambulates steady, no complaints.

## 2017-07-24 ENCOUNTER — PRIOR ORIGINAL RECORDS (OUTPATIENT)
Dept: OTHER | Age: 67
End: 2017-07-24

## 2017-07-31 ENCOUNTER — PRIOR ORIGINAL RECORDS (OUTPATIENT)
Dept: OTHER | Age: 67
End: 2017-07-31

## 2017-08-02 ENCOUNTER — HOSPITAL ENCOUNTER (OUTPATIENT)
Dept: GENERAL RADIOLOGY | Age: 67
Discharge: HOME | End: 2017-08-02
Attending: INTERNAL MEDICINE
Payer: COMMERCIAL

## 2017-08-02 DIAGNOSIS — C50.211 MALIGNANT NEOPLASM OF UPPER-INNER QUADRANT OF RIGHT FEMALE BREAST (HCC): ICD-10-CM

## 2017-08-02 DIAGNOSIS — C79.51 MALIGNANT NEOPLASM METASTATIC TO BONE (HCC): ICD-10-CM

## 2017-08-02 DIAGNOSIS — J91.0 MALIGNANT PLEURAL EFFUSION: ICD-10-CM

## 2017-08-02 PROCEDURE — 71020 XR CHEST PA + LAT CHEST (CPT=71020): CPT | Performed by: INTERNAL MEDICINE

## 2017-08-07 ENCOUNTER — PRIOR ORIGINAL RECORDS (OUTPATIENT)
Dept: OTHER | Age: 67
End: 2017-08-07

## 2017-08-14 ENCOUNTER — PRIOR ORIGINAL RECORDS (OUTPATIENT)
Dept: OTHER | Age: 67
End: 2017-08-14

## 2017-08-21 ENCOUNTER — PRIOR ORIGINAL RECORDS (OUTPATIENT)
Dept: OTHER | Age: 67
End: 2017-08-21

## 2017-08-28 ENCOUNTER — PRIOR ORIGINAL RECORDS (OUTPATIENT)
Dept: OTHER | Age: 67
End: 2017-08-28

## 2017-09-02 ENCOUNTER — HOSPITAL ENCOUNTER (OUTPATIENT)
Dept: CT IMAGING | Age: 67
End: 2017-09-02
Attending: INTERNAL MEDICINE
Payer: COMMERCIAL

## 2017-09-02 ENCOUNTER — HOSPITAL ENCOUNTER (OUTPATIENT)
Dept: CT IMAGING | Age: 67
Discharge: HOME | End: 2017-09-02
Attending: INTERNAL MEDICINE
Payer: COMMERCIAL

## 2017-09-02 DIAGNOSIS — C50.212: ICD-10-CM

## 2017-09-02 DIAGNOSIS — Z17.1: ICD-10-CM

## 2017-09-02 DIAGNOSIS — C50.211: ICD-10-CM

## 2017-09-02 DIAGNOSIS — C79.51 SECONDARY MALIGNANT NEOPLASM OF BONE AND BONE MARROW (HCC): ICD-10-CM

## 2017-09-02 DIAGNOSIS — J91.0 MALIGNANT PLEURAL EFFUSION: ICD-10-CM

## 2017-09-02 DIAGNOSIS — C50.211 MALIGNANT NEOPLASM OF UPPER-INNER QUADRANT OF RIGHT FEMALE BREAST (HCC): ICD-10-CM

## 2017-09-02 DIAGNOSIS — C79.52 SECONDARY MALIGNANT NEOPLASM OF BONE AND BONE MARROW (HCC): ICD-10-CM

## 2017-09-02 PROCEDURE — 74177 CT ABD & PELVIS W/CONTRAST: CPT | Performed by: INTERNAL MEDICINE

## 2017-09-02 PROCEDURE — 71260 CT THORAX DX C+: CPT | Performed by: INTERNAL MEDICINE

## 2017-09-09 ENCOUNTER — APPOINTMENT (OUTPATIENT)
Dept: MRI IMAGING | Age: 67
End: 2017-09-09
Attending: INTERNAL MEDICINE
Payer: COMMERCIAL

## 2017-09-09 ENCOUNTER — HOSPITAL ENCOUNTER (OUTPATIENT)
Dept: MRI IMAGING | Age: 67
Discharge: HOME | End: 2017-09-09
Attending: INTERNAL MEDICINE
Payer: COMMERCIAL

## 2017-09-09 DIAGNOSIS — J91.0 MALIGNANT PLEURAL EFFUSION: ICD-10-CM

## 2017-09-09 DIAGNOSIS — C79.51 SECONDARY MALIGNANT NEOPLASM OF BONE AND BONE MARROW (HCC): ICD-10-CM

## 2017-09-09 DIAGNOSIS — C50.211 MALIGNANT NEOPLASM OF UPPER-INNER QUADRANT OF RIGHT FEMALE BREAST (HCC): ICD-10-CM

## 2017-09-09 DIAGNOSIS — C79.52 SECONDARY MALIGNANT NEOPLASM OF BONE AND BONE MARROW (HCC): ICD-10-CM

## 2017-09-09 PROCEDURE — A9575 INJ GADOTERATE MEGLUMI 0.1ML: HCPCS | Performed by: INTERNAL MEDICINE

## 2017-09-09 PROCEDURE — 72157 MRI CHEST SPINE W/O & W/DYE: CPT | Performed by: INTERNAL MEDICINE

## 2017-09-11 ENCOUNTER — PRIOR ORIGINAL RECORDS (OUTPATIENT)
Dept: OTHER | Age: 67
End: 2017-09-11

## 2017-09-16 ENCOUNTER — HOSPITAL ENCOUNTER (OUTPATIENT)
Dept: CV DIAGNOSTICS | Facility: HOSPITAL | Age: 67
Discharge: HOME | End: 2017-09-16
Attending: INTERNAL MEDICINE
Payer: COMMERCIAL

## 2017-09-16 LAB
ATRIAL RATE: 98 BPM
P AXIS: 67 DEGREES
P-R INTERVAL: 150 MS
Q-T INTERVAL: 430 MS
QRS DURATION: 78 MS
QTC CALCULATION (BEZET): 517 MS
R AXIS: 39 DEGREES
T AXIS: 66 DEGREES
VENTRICULAR RATE: 87 BPM

## 2017-09-16 PROCEDURE — 93010 ELECTROCARDIOGRAM REPORT: CPT | Performed by: INTERNAL MEDICINE

## 2017-09-16 PROCEDURE — 93005 ELECTROCARDIOGRAM TRACING: CPT

## 2017-09-18 ENCOUNTER — PRIOR ORIGINAL RECORDS (OUTPATIENT)
Dept: OTHER | Age: 67
End: 2017-09-18

## 2017-09-23 ENCOUNTER — HOSPITAL ENCOUNTER (OUTPATIENT)
Dept: CV DIAGNOSTICS | Facility: HOSPITAL | Age: 67
Discharge: HOME | End: 2017-09-23
Attending: INTERNAL MEDICINE
Payer: COMMERCIAL

## 2017-09-23 PROCEDURE — 93010 ELECTROCARDIOGRAM REPORT: CPT

## 2017-09-25 ENCOUNTER — PRIOR ORIGINAL RECORDS (OUTPATIENT)
Dept: OTHER | Age: 67
End: 2017-09-25

## 2017-09-26 LAB
P AXIS: 69 DEGREES
P-R INTERVAL: 124 MS
Q-T INTERVAL: 360 MS
QRS DURATION: 76 MS
QTC CALCULATION (BEZET): 481 MS
R AXIS: 9 DEGREES
T AXIS: 60 DEGREES
VENTRICULAR RATE: 107 BPM

## 2017-09-26 PROCEDURE — 93010 ELECTROCARDIOGRAM REPORT: CPT

## 2017-10-02 ENCOUNTER — PRIOR ORIGINAL RECORDS (OUTPATIENT)
Dept: OTHER | Age: 67
End: 2017-10-02

## 2017-10-03 ENCOUNTER — HOSPITAL ENCOUNTER (OUTPATIENT)
Dept: CV DIAGNOSTICS | Age: 67
Discharge: HOME | End: 2017-10-03
Attending: INTERNAL MEDICINE
Payer: COMMERCIAL

## 2017-10-03 DIAGNOSIS — J91.0 MALIGNANT PLEURAL EFFUSION: ICD-10-CM

## 2017-10-03 DIAGNOSIS — I31.3 PERICARDIAL EFFUSION: ICD-10-CM

## 2017-10-03 DIAGNOSIS — C50.211 CARCINOMA OF UPPER-INNER QUADRANT OF FEMALE BREAST, RIGHT (HCC): ICD-10-CM

## 2017-10-03 DIAGNOSIS — C79.52 SECONDARY MALIGNANT NEOPLASM OF BONE AND BONE MARROW (HCC): ICD-10-CM

## 2017-10-03 DIAGNOSIS — C50.211 MALIGNANT NEOPLASM OF UPPER-INNER QUADRANT OF RIGHT FEMALE BREAST (HCC): ICD-10-CM

## 2017-10-03 DIAGNOSIS — C79.51 SECONDARY MALIGNANT NEOPLASM OF BONE AND BONE MARROW (HCC): ICD-10-CM

## 2017-10-03 PROCEDURE — 93306 TTE W/DOPPLER COMPLETE: CPT | Performed by: INTERNAL MEDICINE

## 2017-10-06 ENCOUNTER — HOSPITAL ENCOUNTER (OUTPATIENT)
Dept: CT IMAGING | Facility: HOSPITAL | Age: 67
Discharge: HOME | End: 2017-10-06
Attending: INTERNAL MEDICINE
Payer: COMMERCIAL

## 2017-10-06 DIAGNOSIS — C79.51 SECONDARY MALIGNANT NEOPLASM OF BONE (HCC): ICD-10-CM

## 2017-10-06 DIAGNOSIS — J91.0 MALIGNANT PLEURAL EFFUSION: ICD-10-CM

## 2017-10-06 DIAGNOSIS — C50.211 MALIGNANT NEOPLASM OF UPPER-INNER QUADRANT OF RIGHT FEMALE BREAST (HCC): ICD-10-CM

## 2017-10-06 PROCEDURE — 70491 CT SOFT TISSUE NECK W/DYE: CPT | Performed by: INTERNAL MEDICINE

## 2017-10-09 ENCOUNTER — PRIOR ORIGINAL RECORDS (OUTPATIENT)
Dept: OTHER | Age: 67
End: 2017-10-09

## 2017-12-31 ENCOUNTER — PRIOR ORIGINAL RECORDS (OUTPATIENT)
Dept: OTHER | Age: 67
End: 2017-12-31

## 2018-12-31 ENCOUNTER — PRIOR ORIGINAL RECORDS (OUTPATIENT)
Dept: OTHER | Age: 68
End: 2018-12-31

## 2020-10-09 LAB
ALBUMIN/GLOB SERPL: 1.2 (CALC) (ref 1–2.5)
ALBUMIN/GLOB SERPL: 1.3 (CALC) (ref 1–2.5)
ALBUMIN/GLOB SERPL: 1.4 (CALC) (ref 1–2.5)
ALBUMIN/GLOB SERPL: 1.5 (CALC) (ref 1–2.5)
ALBUMIN/GLOB SERPL: 1.5 (CALC) (ref 1–2.5)
ALBUMIN/GLOB SERPL: 1.6 (CALC) (ref 1–2.5)
ALBUMIN/GLOB SERPL: 1.6 (CALC) (ref 1–2.5)
ALBUMIN/GLOB SERPL: 1.8 (CALC) (ref 1–2.5)
ALBUMIN: 2.7 G/DL (ref 3.6–5.1)
ALBUMIN: 2.8 G/DL (ref 3.6–5.1)
ALBUMIN: 2.8 G/DL (ref 3.6–5.1)
ALBUMIN: 3.1 G/DL (ref 3.6–5.1)
ALBUMIN: 3.2 G/DL (ref 3.6–5.1)
ALBUMIN: 3.5 G/DL (ref 3.6–5.1)
ALBUMIN: 3.5 G/DL (ref 3.6–5.1)
ALBUMIN: 3.6 G/DL (ref 3.6–5.1)
ALBUMIN: 3.7 G/DL (ref 3.6–5.1)
ALBUMIN: 3.7 G/DL (ref 3.6–5.1)
ALBUMIN: 4 G/DL (ref 3.6–5.1)
ALBUMIN: 4 G/DL (ref 3.6–5.1)
ALKALINE PHOSPHATASE: 34 UNIT/L (ref 33–130)
ALKALINE PHOSPHATASE: 35 UNIT/L (ref 33–130)
ALKALINE PHOSPHATASE: 39 UNIT/L (ref 33–130)
ALKALINE PHOSPHATASE: 40 UNIT/L (ref 33–130)
ALKALINE PHOSPHATASE: 40 UNIT/L (ref 33–130)
ALKALINE PHOSPHATASE: 43 UNIT/L (ref 33–130)
ALKALINE PHOSPHATASE: 45 UNIT/L (ref 33–130)
ALKALINE PHOSPHATASE: 47 UNIT/L (ref 33–130)
ALKALINE PHOSPHATASE: 47 UNIT/L (ref 33–130)
ALKALINE PHOSPHATASE: 48 UNIT/L (ref 33–130)
ALKALINE PHOSPHATASE: 50 UNIT/L (ref 33–130)
ALKALINE PHOSPHATASE: 55 UNIT/L (ref 33–130)
ALT: 10 UNIT/L (ref 6–29)
ALT: 13 UNIT/L (ref 6–29)
ALT: 13 UNIT/L (ref 6–29)
ALT: 14 UNIT/L (ref 6–29)
ALT: 15 UNIT/L (ref 6–29)
ALT: 15 UNIT/L (ref 6–29)
ALT: 16 UNIT/L (ref 6–29)
ALT: 21 UNIT/L (ref 6–29)
ALT: 26 UNIT/L (ref 6–29)
ALT: 29 UNIT/L (ref 6–29)
ALT: 43 UNIT/L (ref 6–29)
ALT: 8 UNIT/L (ref 6–29)
APPEARANCE: ABNORMAL
AST: 15 UNIT/L (ref 10–35)
AST: 18 UNIT/L (ref 10–35)
AST: 20 UNIT/L (ref 10–35)
AST: 21 UNIT/L (ref 10–35)
AST: 22 UNIT/L (ref 10–35)
AST: 24 UNIT/L (ref 10–35)
AST: 24 UNIT/L (ref 10–35)
AST: 27 UNIT/L (ref 10–35)
AST: 35 UNIT/L (ref 10–35)
AST: 37 UNIT/L (ref 10–35)
BACTERIA: ABNORMAL /HPF
BASO%: 0 %
BASO%: 0.1 %
BASO%: 0.2 %
BASO%: 0.3 %
BASO%: 0.5 %
BASO%: 0.6 %
BASO%: 0.6 %
BASO%: 1.1 %
BASO%: 1.1 %
BASO%: 1.2 %
BASO%: 1.3 %
BASO%: 1.5 %
BASO%: 1.8 %
BASO%: 2.1 %
BASO%: 2.1 %
BASO%: 2.5 %
BASO%: 2.6 %
BASO: 0 10^3/UL
BASO: 0.1 10^3/UL
BILIRUBIN, TOTAL: 0.2 MG/DL (ref 0.2–1.2)
BILIRUBIN, TOTAL: 0.3 MG/DL (ref 0.2–1.2)
BILIRUBIN, TOTAL: 0.4 MG/DL (ref 0.2–1.2)
BILIRUBIN: NEGATIVE
BUN/CREATININE RATIO: ABNORMAL (CALC) (ref 6–22)
CA 27.29: 1339 UNIT/ML
CA 27.29: 1852 UNIT/ML
CA 27.29: 2065 UNIT/ML
CA 27.29: 2224 UNIT/ML
CA 27.29: 307 UNIT/ML
CA 27.29: 398 UNIT/ML
CA 27.29: 404 UNIT/ML
CA 27.29: 406 UNIT/ML
CA 27.29: 414 UNIT/ML
CA 27.29: 441 UNIT/ML
CALCIUM: 8.1 MG/DL (ref 8.6–10.4)
CALCIUM: 8.2 MG/DL (ref 8.6–10.4)
CALCIUM: 8.3 MG/DL (ref 8.6–10.4)
CALCIUM: 8.3 MG/DL (ref 8.6–10.4)
CALCIUM: 8.4 MG/DL (ref 8.6–10.4)
CALCIUM: 8.4 MG/DL (ref 8.6–10.4)
CALCIUM: 8.7 MG/DL (ref 8.6–10.4)
CALCIUM: 8.8 MG/DL (ref 8.6–10.4)
CALCIUM: 9 MG/DL (ref 8.6–10.4)
CALCIUM: 9.3 MG/DL (ref 8.6–10.4)
CALCIUM: 9.4 MG/DL (ref 8.6–10.4)
CALCIUM: 9.4 MG/DL (ref 8.6–10.4)
CARBON DIOXIDE: 18 MMOL/L (ref 20–31)
CARBON DIOXIDE: 19 MMOL/L (ref 20–31)
CARBON DIOXIDE: 20 MMOL/L (ref 20–31)
CARBON DIOXIDE: 20 MMOL/L (ref 20–31)
CARBON DIOXIDE: 21 MMOL/L (ref 20–31)
CARBON DIOXIDE: 21 MMOL/L (ref 20–31)
CARBON DIOXIDE: 23 MMOL/L (ref 20–31)
CARBON DIOXIDE: 23 MMOL/L (ref 20–31)
CARBON DIOXIDE: 24 MMOL/L (ref 20–31)
CARBON DIOXIDE: 24 MMOL/L (ref 20–31)
CARBON DIOXIDE: 26 MMOL/L (ref 20–31)
CARBON DIOXIDE: 28 MMOL/L (ref 20–31)
CHLORIDE: 101 MMOL/L (ref 98–110)
CHLORIDE: 103 MMOL/L (ref 98–110)
CHLORIDE: 104 MMOL/L (ref 98–110)
CHLORIDE: 105 MMOL/L (ref 98–110)
CHLORIDE: 106 MMOL/L (ref 98–110)
CHLORIDE: 98 MMOL/L (ref 98–110)
COLOR: YELLOW
CRCL (C&G) (MOSAIQ HL): 104.31 ML/MIN
CRCL (C&G) (MOSAIQ HL): 106.08 ML/MIN
CRCL (C&G) (MOSAIQ HL): 108.55 ML/MIN
CRCL (C&G) (MOSAIQ HL): 108.75 ML/MIN
CRCL (C&G) (MOSAIQ HL): 110.09 ML/MIN
CRCL (C&G) (MOSAIQ HL): 111.76 ML/MIN
CRCL (C&G) (MOSAIQ HL): 118.24 ML/MIN
CRCL (C&G) (MOSAIQ HL): 121.45 ML/MIN
CRCL (C&G) (MOSAIQ HL): 123.26 ML/MIN
CRCL (C&G) (MOSAIQ HL): 131.62 ML/MIN
CRCL (C&G) (MOSAIQ HL): 95.73 ML/MIN
CRCL (C&G) (MOSAIQ HL): 97.82 ML/MIN
CREATININE CLEARANCE (MOSAIQ HL): 63.2 ML/MIN
CREATININE CLEARANCE (MOSAIQ HL): 64.7 ML/MIN
CREATININE CLEARANCE (MOSAIQ HL): 66.3 ML/MIN
CREATININE CLEARANCE (MOSAIQ HL): 68.8 ML/MIN
CREATININE CLEARANCE (MOSAIQ HL): 69.7 ML/MIN
CREATININE CLEARANCE (MOSAIQ HL): 73.5 ML/MIN
CREATININE CLEARANCE (MOSAIQ HL): 81.3 ML/MIN
CREATININE CLEARANCE (MOSAIQ HL): 82.5 ML/MIN
CREATININE CLEARANCE (MOSAIQ HL): 83.8 ML/MIN
CREATININE CLEARANCE (MOSAIQ HL): 85.1 ML/MIN
CREATININE CLEARANCE (MOSAIQ HL): 85.1 ML/MIN
CREATININE CLEARANCE (MOSAIQ HL): 92.5 ML/MIN
CREATININE: 0.58 MG/DL (ref 0.5–0.99)
CREATININE: 0.63 MG/DL (ref 0.5–0.99)
CREATININE: 0.63 MG/DL (ref 0.5–0.99)
CREATININE: 0.64 MG/DL (ref 0.5–0.99)
CREATININE: 0.65 MG/DL (ref 0.5–0.99)
CREATININE: 0.66 MG/DL (ref 0.5–0.99)
CREATININE: 0.73 MG/DL (ref 0.5–0.99)
CREATININE: 0.78 MG/DL (ref 0.5–0.99)
CREATININE: 0.78 MG/DL (ref 0.5–0.99)
CREATININE: 0.82 MG/DL (ref 0.5–0.99)
CREATININE: 0.84 MG/DL (ref 0.5–0.99)
CREATININE: 0.86 MG/DL (ref 0.5–0.99)
CULTURE: ABNORMAL
EGFR AFRICAN AMERICAN: 105 ML/MIN/1.73M2
EGFR AFRICAN AMERICAN: 106 ML/MIN/1.73M2
EGFR AFRICAN AMERICAN: 106 ML/MIN/1.73M2
EGFR AFRICAN AMERICAN: 107 ML/MIN/1.73M2
EGFR AFRICAN AMERICAN: 107 ML/MIN/1.73M2
EGFR AFRICAN AMERICAN: 110 ML/MIN/1.73M2
EGFR AFRICAN AMERICAN: 81 ML/MIN/1.73M2
EGFR AFRICAN AMERICAN: 83 ML/MIN/1.73M2
EGFR AFRICAN AMERICAN: 86 ML/MIN/1.73M2
EGFR AFRICAN AMERICAN: 91 ML/MIN/1.73M2
EGFR AFRICAN AMERICAN: 91 ML/MIN/1.73M2
EGFR AFRICAN AMERICAN: 98 ML/MIN/1.73M2
EGFR NON-AFR. AMERICAN: 70 ML/MIN/1.73M2
EGFR NON-AFR. AMERICAN: 72 ML/MIN/1.73M2
EGFR NON-AFR. AMERICAN: 74 ML/MIN/1.73M2
EGFR NON-AFR. AMERICAN: 78 ML/MIN/1.73M2
EGFR NON-AFR. AMERICAN: 79 ML/MIN/1.73M2
EGFR NON-AFR. AMERICAN: 85 ML/MIN/1.73M2
EGFR NON-AFR. AMERICAN: 91 ML/MIN/1.73M2
EGFR NON-AFR. AMERICAN: 91 ML/MIN/1.73M2
EGFR NON-AFR. AMERICAN: 92 ML/MIN/1.73M2
EGFR NON-AFR. AMERICAN: 95 ML/MIN/1.73M2
EOS%: 0 %
EOS%: 0.2 %
EOS%: 0.3 %
EOS%: 0.4 %
EOS%: 0.9 %
EOS%: 1.2 %
EOS%: 1.5 %
EOS%: 1.8 %
EOS%: 2.4 %
EOS%: 2.4 %
EOS%: 2.6 %
EOS%: 7 %
EOS: 0 10^3/UL
EOS: 0.1 10^3/UL
EOS: 0.3 10^3/UL
GLOBULIN: 1.7 G/DL (CALC) (ref 1.9–3.7)
GLOBULIN: 2 G/DL (CALC) (ref 1.9–3.7)
GLOBULIN: 2 G/DL (CALC) (ref 1.9–3.7)
GLOBULIN: 2.1 G/DL (CALC) (ref 1.9–3.7)
GLOBULIN: 2.3 G/DL (CALC) (ref 1.9–3.7)
GLOBULIN: 2.3 G/DL (CALC) (ref 1.9–3.7)
GLOBULIN: 2.4 G/DL (CALC) (ref 1.9–3.7)
GLOBULIN: 2.5 G/DL (CALC) (ref 1.9–3.7)
GLOBULIN: 2.7 G/DL (CALC) (ref 1.9–3.7)
GLOBULIN: 2.7 G/DL (CALC) (ref 1.9–3.7)
GLOBULIN: 2.8 G/DL (CALC) (ref 1.9–3.7)
GLOBULIN: 2.8 G/DL (CALC) (ref 1.9–3.7)
GLUCOSE: 104 MG/DL (ref 65–99)
GLUCOSE: 115 MG/DL (ref 65–99)
GLUCOSE: 118 MG/DL (ref 65–99)
GLUCOSE: 131 MG/DL (ref 65–99)
GLUCOSE: 132 MG/DL (ref 65–99)
GLUCOSE: 133 MG/DL (ref 65–99)
GLUCOSE: 134 MG/DL (ref 65–99)
GLUCOSE: 136 MG/DL (ref 65–99)
GLUCOSE: 138 MG/DL (ref 65–99)
GLUCOSE: 90 MG/DL (ref 65–99)
GLUCOSE: 92 MG/DL (ref 65–99)
GLUCOSE: 96 MG/DL (ref 65–99)
GLUCOSE: NEGATIVE
HCT: 29.7 % (ref 38–54)
HCT: 29.9 % (ref 38–54)
HCT: 30.3 % (ref 38–54)
HCT: 30.4 % (ref 38–54)
HCT: 31.7 % (ref 38–54)
HCT: 31.7 % (ref 38–54)
HCT: 31.8 % (ref 38–54)
HCT: 31.9 % (ref 38–54)
HCT: 32.4 % (ref 38–54)
HCT: 32.8 % (ref 38–54)
HCT: 33 % (ref 38–54)
HCT: 33.2 % (ref 38–54)
HCT: 33.2 % (ref 38–54)
HCT: 33.7 % (ref 38–54)
HCT: 33.7 % (ref 38–54)
HCT: 33.8 % (ref 38–54)
HCT: 33.8 % (ref 38–54)
HCT: 34.2 % (ref 38–54)
HCT: 34.7 % (ref 38–54)
HCT: 34.9 % (ref 38–54)
HCT: 35 % (ref 38–54)
HCT: 35.1 % (ref 38–54)
HCT: 35.1 % (ref 38–54)
HCT: 35.2 % (ref 38–54)
HCT: 35.5 % (ref 38–54)
HCT: 35.8 % (ref 38–54)
HCT: 36.1 % (ref 38–54)
HCT: 36.3 % (ref 38–54)
HCT: 36.5 % (ref 38–54)
HCT: 36.7 % (ref 38–54)
HCT: 37 % (ref 38–54)
HCT: 37.7 % (ref 38–54)
HGB: 10.1 G/DL (ref 12–18)
HGB: 10.2 G/DL (ref 12–18)
HGB: 10.3 G/DL (ref 12–18)
HGB: 10.4 G/DL (ref 12–18)
HGB: 10.5 G/DL (ref 12–18)
HGB: 10.5 G/DL (ref 12–18)
HGB: 10.6 G/DL (ref 12–18)
HGB: 10.7 G/DL (ref 12–18)
HGB: 10.8 G/DL (ref 12–18)
HGB: 10.9 G/DL (ref 12–18)
HGB: 11 G/DL (ref 12–18)
HGB: 11.3 G/DL (ref 12–18)
HGB: 11.3 G/DL (ref 12–18)
HGB: 11.4 G/DL (ref 12–18)
HGB: 11.6 G/DL (ref 12–18)
HGB: 11.8 G/DL (ref 12–18)
HGB: 11.9 G/DL (ref 12–18)
HGB: 11.9 G/DL (ref 12–18)
HGB: 12.1 G/DL (ref 12–18)
HGB: 12.3 G/DL (ref 12–18)
HGB: 12.5 G/DL (ref 12–18)
HGB: 12.6 G/DL (ref 12–18)
HGB: 9.8 G/DL (ref 12–18)
HGB: 9.9 G/DL (ref 12–18)
HOC INR: 1.2
HOC INR: 1.4
HOC INR: 1.5
HOC INR: 1.5
HOC INR: 1.8
HOC INR: 1.9
HOC INR: 2
HOC INR: 2
HOC INR: 2.1
HOC INR: 2.2
HOC INR: 2.3
HOC INR: 2.4
HOC INR: 2.5
HOC INR: 2.7
HOC INR: 3.1
HOC INR: 3.6
HOC INR: 3.6
HOC INR: 3.8
HOC INR: 4
HOC INR: 5.8
HOC INR: NORMAL
HOC PT: 14.8
HOC PT: 15.2
HOC PT: 18.4
HOC PT: 21.5
HOC PT: 21.7
HOC PT: 21.8
HOC PT: 22
HOC PT: 22.3
HOC PT: 23.3
HOC PT: 23.4
HOC PT: 24.9
HOC PT: 26.2
HOC PT: 26.3
HOC PT: 26.9
HOC PT: 27.9
HOC PT: 28.5
HOC PT: 29.8
HOC PT: 32.1
HOC PT: 367
HOC PT: 38.3
HOC PT: 42.8
HOC PT: 43.5
HOC PT: 47.9
HOC PT: 69.4
HYALINE CAST: ABNORMAL /LPF
INR: 1.2 SECONDS (ref 0–7.9)
INR: 1.4 SECONDS (ref 0–7.9)
INR: 1.5 SECONDS (ref 0–7.9)
INR: 1.8 SECONDS (ref 0–7.9)
INR: 1.8 SECONDS (ref 0–7.9)
INR: 1.9 SECONDS (ref 0–7.9)
INR: 2 SECONDS (ref 0–7.9)
INR: 2.1 SECONDS (ref 0–7.9)
INR: 2.2 SECONDS (ref 0–7.9)
INR: 2.2 SECONDS (ref 0–7.9)
INR: 2.4 SECONDS (ref 0–7.9)
INR: 2.5 SECONDS (ref 0–7.9)
INR: 2.7 SECONDS (ref 0–7.9)
INR: 3.3 SECONDS (ref 0–7.9)
INR: 3.6 SECONDS (ref 0–7.9)
INR: 4 SECONDS (ref 0–7.9)
INR: 5.8 SECONDS (ref 0–7.9)
KETONES: NEGATIVE
LEUKOCYTE ESTERASE: ABNORMAL
LYMPH%: 10.1 % (ref 12–44)
LYMPH%: 10.2 % (ref 12–44)
LYMPH%: 10.5 % (ref 12–44)
LYMPH%: 10.9 % (ref 12–44)
LYMPH%: 11.9 % (ref 12–44)
LYMPH%: 12.1 % (ref 12–44)
LYMPH%: 12.6 % (ref 12–44)
LYMPH%: 12.9 % (ref 12–44)
LYMPH%: 12.9 % (ref 12–44)
LYMPH%: 14.7 % (ref 12–44)
LYMPH%: 15.1 % (ref 12–44)
LYMPH%: 15.3 % (ref 12–44)
LYMPH%: 16.2 % (ref 12–44)
LYMPH%: 17.4 % (ref 12–44)
LYMPH%: 17.5 % (ref 12–44)
LYMPH%: 18.9 % (ref 12–44)
LYMPH%: 23.2 % (ref 12–44)
LYMPH%: 26.1 % (ref 12–44)
LYMPH%: 26.3 % (ref 12–44)
LYMPH%: 27.9 % (ref 12–44)
LYMPH%: 3.9 % (ref 12–44)
LYMPH%: 33.3 % (ref 12–44)
LYMPH%: 34.8 % (ref 12–44)
LYMPH%: 38.6 % (ref 12–44)
LYMPH%: 4.2 % (ref 12–44)
LYMPH%: 4.9 % (ref 12–44)
LYMPH%: 5 % (ref 12–44)
LYMPH%: 6.4 % (ref 12–44)
LYMPH%: 7.2 % (ref 12–44)
LYMPH%: 9 % (ref 12–44)
LYMPH%: 9.2 % (ref 12–44)
LYMPH%: 9.8 % (ref 12–44)
LYMPH: 0.2 10^3/UL (ref 0.8–2.8)
LYMPH: 0.3 10^3/UL (ref 0.8–2.8)
LYMPH: 0.4 10^3/UL (ref 0.8–2.8)
LYMPH: 0.5 10^3/UL (ref 0.8–2.8)
LYMPH: 0.6 10^3/UL (ref 0.8–2.8)
LYMPH: 0.7 10^3/UL (ref 0.8–2.8)
LYMPH: 0.7 10^3/UL (ref 0.8–2.8)
LYMPH: 0.8 10^3/UL (ref 0.8–2.8)
LYMPH: 0.9 10^3/UL (ref 0.8–2.8)
LYMPH: 0.9 10^3/UL (ref 0.8–2.8)
MCH: 27.2 PG (ref 26–33)
MCH: 27.4 PG (ref 26–33)
MCH: 27.5 PG (ref 26–33)
MCH: 27.5 PG (ref 26–33)
MCH: 27.6 PG (ref 26–33)
MCH: 27.6 PG (ref 26–33)
MCH: 27.7 PG (ref 26–33)
MCH: 27.8 PG (ref 26–33)
MCH: 27.9 PG (ref 26–33)
MCH: 28 PG (ref 26–33)
MCH: 28.1 PG (ref 26–33)
MCH: 28.3 PG (ref 26–33)
MCH: 28.4 PG (ref 26–33)
MCH: 28.5 PG (ref 26–33)
MCH: 28.5 PG (ref 26–33)
MCH: 28.6 PG (ref 26–33)
MCH: 28.6 PG (ref 26–33)
MCHC: 31.6 G/DL (ref 31–36)
MCHC: 31.7 G/DL (ref 31–36)
MCHC: 31.9 G/DL (ref 31–36)
MCHC: 32.2 G/DL (ref 31–36)
MCHC: 32.3 G/DL (ref 31–36)
MCHC: 32.3 G/DL (ref 31–36)
MCHC: 32.4 G/DL (ref 31–36)
MCHC: 32.4 G/DL (ref 31–36)
MCHC: 32.6 G/DL (ref 31–36)
MCHC: 32.7 G/DL (ref 31–36)
MCHC: 32.8 G/DL (ref 31–36)
MCHC: 33 G/DL (ref 31–36)
MCHC: 33.1 G/DL (ref 31–36)
MCHC: 33.4 G/DL (ref 31–36)
MCHC: 33.5 G/DL (ref 31–36)
MCHC: 33.5 G/DL (ref 31–36)
MCHC: 33.7 G/DL (ref 31–36)
MCHC: 33.8 G/DL (ref 31–36)
MCHC: 33.9 G/DL (ref 31–36)
MCHC: 33.9 G/DL (ref 31–36)
MCHC: 34 G/DL (ref 31–36)
MCV: 81.9 FML (ref 82–100)
MCV: 82.4 FML (ref 82–100)
MCV: 82.4 FML (ref 82–100)
MCV: 82.5 FML (ref 82–100)
MCV: 82.6 FML (ref 82–100)
MCV: 82.6 FML (ref 82–100)
MCV: 82.9 FML (ref 82–100)
MCV: 83.1 FML (ref 82–100)
MCV: 83.2 FML (ref 82–100)
MCV: 83.6 FML (ref 82–100)
MCV: 83.6 FML (ref 82–100)
MCV: 83.8 FML (ref 82–100)
MCV: 84.4 FML (ref 82–100)
MCV: 85.1 FML (ref 82–100)
MCV: 85.1 FML (ref 82–100)
MCV: 85.2 FML (ref 82–100)
MCV: 85.3 FML (ref 82–100)
MCV: 85.4 FML (ref 82–100)
MCV: 86 FML (ref 82–100)
MCV: 86.2 FML (ref 82–100)
MCV: 86.2 FML (ref 82–100)
MCV: 86.3 FML (ref 82–100)
MCV: 87.1 FML (ref 82–100)
MCV: 87.3 FML (ref 82–100)
MCV: 87.3 FML (ref 82–100)
MCV: 87.6 FML (ref 82–100)
MCV: 87.6 FML (ref 82–100)
MCV: 87.8 FML (ref 82–100)
MCV: 87.8 FML (ref 82–100)
MCV: 88.1 FML (ref 82–100)
MONO%: 1.5 % (ref 2–12)
MONO%: 1.8 % (ref 2–12)
MONO%: 10 % (ref 2–12)
MONO%: 10.6 % (ref 2–12)
MONO%: 11.3 % (ref 2–12)
MONO%: 12.2 % (ref 2–12)
MONO%: 12.6 % (ref 2–12)
MONO%: 14 % (ref 2–12)
MONO%: 14.7 % (ref 2–12)
MONO%: 14.9 % (ref 2–12)
MONO%: 15.2 % (ref 2–12)
MONO%: 16.2 % (ref 2–12)
MONO%: 16.6 % (ref 2–12)
MONO%: 17.3 % (ref 2–12)
MONO%: 2.4 % (ref 2–12)
MONO%: 2.5 % (ref 2–12)
MONO%: 2.5 % (ref 2–12)
MONO%: 2.9 % (ref 2–12)
MONO%: 26.6 % (ref 2–12)
MONO%: 3.4 % (ref 2–12)
MONO%: 3.7 % (ref 2–12)
MONO%: 31.6 % (ref 2–12)
MONO%: 4.5 % (ref 2–12)
MONO%: 4.6 % (ref 2–12)
MONO%: 4.7 % (ref 2–12)
MONO%: 4.9 % (ref 2–12)
MONO%: 5.8 % (ref 2–12)
MONO%: 7.4 % (ref 2–12)
MONO%: 8.1 % (ref 2–12)
MONO%: 8.8 % (ref 2–12)
MONO%: 9.7 % (ref 2–12)
MONO%: 9.8 % (ref 2–12)
MONO: 0.1 10^3/UL (ref 0.2–1)
MONO: 0.2 10^3/UL (ref 0.2–1)
MONO: 0.2 10^3/UL (ref 0.2–1)
MONO: 0.3 10^3/UL (ref 0.2–1)
MONO: 0.4 10^3/UL (ref 0.2–1)
MONO: 0.4 10^3/UL (ref 0.2–1)
MONO: 0.5 10^3/UL (ref 0.2–1)
MONO: 0.6 10^3/UL (ref 0.2–1)
MONO: 1 10^3/UL (ref 0.2–1)
MONO: 1 10^3/UL (ref 0.2–1)
MONO: 1.1 10^3/UL (ref 0.2–1)
MPV: 10 FML (ref 8.6–11.7)
MPV: 10 FML (ref 8.6–11.7)
MPV: 10.1 FML (ref 8.6–11.7)
MPV: 10.1 FML (ref 8.6–11.7)
MPV: 10.6 FML (ref 8.6–11.7)
MPV: 8.4 FML (ref 8.6–11.7)
MPV: 8.5 FML (ref 8.6–11.7)
MPV: 8.8 FML (ref 8.6–11.7)
MPV: 8.9 FML (ref 8.6–11.7)
MPV: 9.1 FML (ref 8.6–11.7)
MPV: 9.1 FML (ref 8.6–11.7)
MPV: 9.2 FML (ref 8.6–11.7)
MPV: 9.3 FML (ref 8.6–11.7)
MPV: 9.4 FML (ref 8.6–11.7)
MPV: 9.4 FML (ref 8.6–11.7)
MPV: 9.5 FML (ref 8.6–11.7)
MPV: 9.5 FML (ref 8.6–11.7)
MPV: 9.6 FML (ref 8.6–11.7)
MPV: 9.7 FML (ref 8.6–11.7)
MPV: 9.7 FML (ref 8.6–11.7)
MPV: 9.8 FML (ref 8.6–11.7)
MPV: 9.8 FML (ref 8.6–11.7)
MPV: 9.9 FML (ref 8.6–11.7)
NEUT%: 32.3 % (ref 47–76)
NEUT%: 45.6 % (ref 47–76)
NEUT%: 45.8 % (ref 47–76)
NEUT%: 47.9 % (ref 47–76)
NEUT%: 54.3 % (ref 47–76)
NEUT%: 54.3 % (ref 47–76)
NEUT%: 60.8 % (ref 47–76)
NEUT%: 67 % (ref 47–76)
NEUT%: 67.8 % (ref 47–76)
NEUT%: 68.6 % (ref 47–76)
NEUT%: 72.1 % (ref 47–76)
NEUT%: 72.6 % (ref 47–76)
NEUT%: 74.2 % (ref 47–76)
NEUT%: 74.3 % (ref 47–76)
NEUT%: 75.4 % (ref 47–76)
NEUT%: 75.9 % (ref 47–76)
NEUT%: 76.1 % (ref 47–76)
NEUT%: 76.2 % (ref 47–76)
NEUT%: 78.1 % (ref 47–76)
NEUT%: 80.7 % (ref 47–76)
NEUT%: 81 % (ref 47–76)
NEUT%: 82.1 % (ref 47–76)
NEUT%: 84.4 % (ref 47–76)
NEUT%: 84.4 % (ref 47–76)
NEUT%: 86.5 % (ref 47–76)
NEUT%: 87.7 % (ref 47–76)
NEUT%: 87.7 % (ref 47–76)
NEUT%: 90.2 % (ref 47–76)
NEUT%: 90.3 % (ref 47–76)
NEUT%: 90.8 % (ref 47–76)
NEUT%: 93.4 % (ref 47–76)
NEUT%: 94.3 % (ref 47–76)
NEUT: 0.3 10^3/UL (ref 1.5–7.1)
NEUT: 0.5 10^3/UL (ref 1.5–7.1)
NEUT: 0.7 10^3/UL (ref 1.5–7.1)
NEUT: 0.8 10^3/UL (ref 1.5–7.1)
NEUT: 1 10^3/UL (ref 1.5–7.1)
NEUT: 1.1 10^3/UL (ref 1.5–7.1)
NEUT: 1.3 10^3/UL (ref 1.5–7.1)
NEUT: 1.3 10^3/UL (ref 1.5–7.1)
NEUT: 1.4 10^3/UL (ref 1.5–7.1)
NEUT: 1.8 10^3/UL (ref 1.5–7.1)
NEUT: 11.5 10^3/UL (ref 1.5–7.1)
NEUT: 2 10^3/UL (ref 1.5–7.1)
NEUT: 2 10^3/UL (ref 1.5–7.1)
NEUT: 2.4 10^3/UL (ref 1.5–7.1)
NEUT: 2.5 10^3/UL (ref 1.5–7.1)
NEUT: 2.6 10^3/UL (ref 1.5–7.1)
NEUT: 2.8 10^3/UL (ref 1.5–7.1)
NEUT: 2.9 10^3/UL (ref 1.5–7.1)
NEUT: 2.9 10^3/UL (ref 1.5–7.1)
NEUT: 3.2 10^3/UL (ref 1.5–7.1)
NEUT: 3.8 10^3/UL (ref 1.5–7.1)
NEUT: 4 10^3/UL (ref 1.5–7.1)
NEUT: 4.2 10^3/UL (ref 1.5–7.1)
NEUT: 4.4 10^3/UL (ref 1.5–7.1)
NEUT: 5 10^3/UL (ref 1.5–7.1)
NEUT: 5.2 10^3/UL (ref 1.5–7.1)
NEUT: 5.9 10^3/UL (ref 1.5–7.1)
NEUT: 8.6 10^3/UL (ref 1.5–7.1)
NEUT: 9.2 10^3/UL (ref 1.5–7.1)
NEUT: 9.8 10^3/UL (ref 1.5–7.1)
NITRITE: NEGATIVE
OCCULT BLOOD: ABNORMAL
PH: ABNORMAL (ref 5–8)
PLT: 256 10^3/UL (ref 150–375)
PLT: 273 10^3/UL (ref 150–375)
PLT: 278 10^3/UL (ref 150–375)
PLT: 281 10^3/UL (ref 150–375)
PLT: 281 10^3/UL (ref 150–375)
PLT: 296 10^3/UL (ref 150–375)
PLT: 298 10^3/UL (ref 150–375)
PLT: 314 10^3/UL (ref 150–375)
PLT: 318 10^3/UL (ref 150–375)
PLT: 320 10^3/UL (ref 150–375)
PLT: 321 10^3/UL (ref 150–375)
PLT: 321 10^3/UL (ref 150–375)
PLT: 323 10^3/UL (ref 150–375)
PLT: 323 10^3/UL (ref 150–375)
PLT: 330 10^3/UL (ref 150–375)
PLT: 330 10^3/UL (ref 150–375)
PLT: 335 10^3/UL (ref 150–375)
PLT: 336 10^3/UL (ref 150–375)
PLT: 339 10^3/UL (ref 150–375)
PLT: 348 10^3/UL (ref 150–375)
PLT: 349 10^3/UL (ref 150–375)
PLT: 355 10^3/UL (ref 150–375)
PLT: 356 10^3/UL (ref 150–375)
PLT: 375 10^3/UL (ref 150–375)
PLT: 380 10^3/UL (ref 150–375)
PLT: 393 10^3/UL (ref 150–375)
PLT: 397 10^3/UL (ref 150–375)
PLT: 406 10^3/UL (ref 150–375)
PLT: 434 10^3/UL (ref 150–375)
PLT: 443 10^3/UL (ref 150–375)
PLT: 464 10^3/UL (ref 150–375)
PLT: 475 10^3/UL (ref 150–375)
POTASSIUM: 3.6 MMOL/L (ref 3.5–5.3)
POTASSIUM: 3.7 MMOL/L (ref 3.5–5.3)
POTASSIUM: 3.7 MMOL/L (ref 3.5–5.3)
POTASSIUM: 3.9 MMOL/L (ref 3.5–5.3)
POTASSIUM: 3.9 MMOL/L (ref 3.5–5.3)
POTASSIUM: 4 MMOL/L (ref 3.5–5.3)
POTASSIUM: 4.1 MMOL/L (ref 3.5–5.3)
POTASSIUM: 4.1 MMOL/L (ref 3.5–5.3)
POTASSIUM: 4.3 MMOL/L (ref 3.5–5.3)
POTASSIUM: 4.3 MMOL/L (ref 3.5–5.3)
POTASSIUM: 4.4 MMOL/L (ref 3.5–5.3)
POTASSIUM: 4.5 MMOL/L (ref 3.5–5.3)
PROTEIN, TOTAL: 4.5 G/DL (ref 6.1–8.1)
PROTEIN, TOTAL: 4.9 G/DL (ref 6.1–8.1)
PROTEIN, TOTAL: 5 G/DL (ref 6.1–8.1)
PROTEIN, TOTAL: 5.1 G/DL (ref 6.1–8.1)
PROTEIN, TOTAL: 5.5 G/DL (ref 6.1–8.1)
PROTEIN, TOTAL: 5.6 G/DL (ref 6.1–8.1)
PROTEIN, TOTAL: 5.8 G/DL (ref 6.1–8.1)
PROTEIN, TOTAL: 6.1 G/DL (ref 6.1–8.1)
PROTEIN, TOTAL: 6.4 G/DL (ref 6.1–8.1)
PROTEIN, TOTAL: 6.5 G/DL (ref 6.1–8.1)
PROTEIN, TOTAL: 6.7 G/DL (ref 6.1–8.1)
PROTEIN, TOTAL: 6.8 G/DL (ref 6.1–8.1)
PROTEIN: ABNORMAL
PT: 14.8 1000/UL (ref 0–39.9)
PT: 17.2 1000/UL (ref 0–39.9)
PT: 18.4 1000/UL (ref 0–39.9)
PT: 21.5 1000/UL (ref 0–39.9)
PT: 21.7 1000/UL (ref 0–39.9)
PT: 22.3 1000/UL (ref 0–39.9)
PT: 23.4 1000/UL (ref 0–39.9)
PT: 24.9 1000/UL (ref 0–39.9)
PT: 26.3 1000/UL (ref 0–39.9)
PT: 26.9 1000/UL (ref 0–39.9)
PT: 28.5 1000/UL (ref 0–39.9)
PT: 29.8 1000/UL (ref 0–39.9)
PT: 32.1 1000/UL (ref 0–39.9)
PT: 38.3 1000/UL (ref 0–39.9)
PT: 42.8 1000/UL (ref 0–39.9)
PT: 47.9 1000/UL (ref 0–39.9)
PT: 69.4 1000/UL (ref 0–39.9)
RBC: 3.44 10^6/UL (ref 4.2–6.2)
RBC: 3.49 10^6/UL (ref 4.2–6.2)
RBC: 3.5 10^6/UL (ref 4.2–6.2)
RBC: 3.59 10^6/UL (ref 4.2–6.2)
RBC: 3.6 10^6/UL (ref 4.2–6.2)
RBC: 3.61 10^6/UL (ref 4.2–6.2)
RBC: 3.64 10^6/UL (ref 4.2–6.2)
RBC: 3.69 10^6/UL (ref 4.2–6.2)
RBC: 3.71 10^6/UL (ref 4.2–6.2)
RBC: 3.78 10^6/UL (ref 4.2–6.2)
RBC: 3.85 10^6/UL (ref 4.2–6.2)
RBC: 3.85 10^6/UL (ref 4.2–6.2)
RBC: 3.86 10^6/UL (ref 4.2–6.2)
RBC: 3.86 10^6/UL (ref 4.2–6.2)
RBC: 3.96 10^6/UL (ref 4.2–6.2)
RBC: 4.02 10^6/UL (ref 4.2–6.2)
RBC: 4.07 10^6/UL (ref 4.2–6.2)
RBC: 4.08 10^6/UL (ref 4.2–6.2)
RBC: 4.09 10^6/UL (ref 4.2–6.2)
RBC: 4.09 10^6/UL (ref 4.2–6.2)
RBC: 4.14 10^6/UL (ref 4.2–6.2)
RBC: 4.19 10^6/UL (ref 4.2–6.2)
RBC: 4.23 10^6/UL (ref 4.2–6.2)
RBC: 4.25 10^6/UL (ref 4.2–6.2)
RBC: 4.27 10^6/UL (ref 4.2–6.2)
RBC: 4.29 10^6/UL (ref 4.2–6.2)
RBC: 4.34 10^6/UL (ref 4.2–6.2)
RBC: 4.34 10^6/UL (ref 4.2–6.2)
RBC: 4.37 10^6/UL (ref 4.2–6.2)
RBC: 4.39 10^6/UL (ref 4.2–6.2)
RBC: 4.52 10^6/UL (ref 4.2–6.2)
RBC: 4.55 10^6/UL (ref 4.2–6.2)
RBC: ABNORMAL /HPF
RDW-CV: 19.6 %
RDW-CV: 20 %
RDW-CV: 20.2 %
RDW-CV: 20.2 %
RDW-CV: 20.3 %
RDW-CV: 20.3 %
RDW-CV: 20.5 %
RDW-CV: 20.6 %
RDW-CV: 20.9 %
RDW-CV: 21 %
RDW-CV: 21 %
RDW-CV: 21.1 %
RDW-CV: 21.1 %
RDW-CV: 21.3 %
RDW-CV: 21.4 %
RDW-CV: 21.5 %
RDW-CV: 21.6 %
RDW-CV: 21.7 %
RDW-CV: 21.7 %
RDW-CV: 21.8 %
RDW-CV: 21.8 %
RDW-CV: 21.9 %
RDW-CV: 22 %
RDW-CV: 22.2 %
RDW-CV: 22.3 %
RDW-CV: 22.3 %
RDW-CV: 22.6 %
RDW-CV: 22.8 %
RDW-SD: 57.3 FML (ref 36–50)
RDW-SD: 58.2 FML (ref 36–50)
RDW-SD: 59.3 FML (ref 36–50)
RDW-SD: 59.4 FML (ref 36–50)
RDW-SD: 59.5 FML (ref 36–50)
RDW-SD: 59.6 FML (ref 36–50)
RDW-SD: 60.1 FML (ref 36–50)
RDW-SD: 60.8 FML (ref 36–50)
RDW-SD: 60.8 FML (ref 36–50)
RDW-SD: 61.5 FML (ref 36–50)
RDW-SD: 61.5 FML (ref 36–50)
RDW-SD: 61.9 FML (ref 36–50)
RDW-SD: 62.5 FML (ref 36–50)
RDW-SD: 62.9 FML (ref 36–50)
RDW-SD: 62.9 FML (ref 36–50)
RDW-SD: 63.5 FML (ref 36–50)
RDW-SD: 63.8 FML (ref 36–50)
RDW-SD: 64 FML (ref 36–50)
RDW-SD: 64.1 FML (ref 36–50)
RDW-SD: 64.1 FML (ref 36–50)
RDW-SD: 64.3 FML (ref 36–50)
RDW-SD: 64.6 FML (ref 36–50)
RDW-SD: 64.6 FML (ref 36–50)
RDW-SD: 65 FML (ref 36–50)
RDW-SD: 65.3 FML (ref 36–50)
RDW-SD: 65.6 FML (ref 36–50)
RDW-SD: 65.6 FML (ref 36–50)
RDW-SD: 66.5 FML (ref 36–50)
RDW-SD: 67.2 FML (ref 36–50)
RDW-SD: 67.7 FML (ref 36–50)
RDW-SD: 68.7 FML (ref 36–50)
RDW-SD: 69.2 FML (ref 36–50)
REFLEXIVE URINE CULTURE: NORMAL
SODIUM: 135 MMOL/L (ref 135–146)
SODIUM: 136 MMOL/L (ref 135–146)
SODIUM: 136 MMOL/L (ref 135–146)
SODIUM: 137 MMOL/L (ref 135–146)
SODIUM: 137 MMOL/L (ref 135–146)
SODIUM: 138 MMOL/L (ref 135–146)
SODIUM: 138 MMOL/L (ref 135–146)
SODIUM: 140 MMOL/L (ref 135–146)
SODIUM: 140 MMOL/L (ref 135–146)
SODIUM: 141 MMOL/L (ref 135–146)
SPECIFIC GRAVITY: 1.02 (ref 1–1.03)
SQUAMOUS EPITHELIAL CELLS: ABNORMAL /HPF
UREA NITROGEN (BUN): 10 MG/DL (ref 7–25)
UREA NITROGEN (BUN): 11 MG/DL (ref 7–25)
UREA NITROGEN (BUN): 13 MG/DL (ref 7–25)
UREA NITROGEN (BUN): 13 MG/DL (ref 7–25)
UREA NITROGEN (BUN): 15 MG/DL (ref 7–25)
UREA NITROGEN (BUN): 16 MG/DL (ref 7–25)
UREA NITROGEN (BUN): 17 MG/DL (ref 7–25)
UREA NITROGEN (BUN): 8 MG/DL (ref 7–25)
UREA NITROGEN (BUN): 8 MG/DL (ref 7–25)
UREA NITROGEN (BUN): 9 MG/DL (ref 7–25)
WBC: 0.6 10^3/UL (ref 4.3–11)
WBC: 1.2 10^3/UL (ref 4.3–11)
WBC: 1.6 10^3/UL (ref 4.3–11)
WBC: 1.7 10^3/UL (ref 4.3–11)
WBC: 1.7 10^3/UL (ref 4.3–11)
WBC: 1.8 10^3/UL (ref 4.3–11)
WBC: 1.9 10^3/UL (ref 4.3–11)
WBC: 1.9 10^3/UL (ref 4.3–11)
WBC: 10.2 10^3/UL (ref 4.3–11)
WBC: 10.3 10^3/UL (ref 4.3–11)
WBC: 12.3 10^3/UL (ref 4.3–11)
WBC: 2.1 10^3/UL (ref 4.3–11)
WBC: 2.4 10^3/UL (ref 4.3–11)
WBC: 2.7 10^3/UL (ref 4.3–11)
WBC: 2.7 10^3/UL (ref 4.3–11)
WBC: 3.3 10^3/UL (ref 4.3–11)
WBC: 3.4 10^3/UL (ref 4.3–11)
WBC: 3.5 10^3/UL (ref 4.3–11)
WBC: 3.8 10^3/UL (ref 4.3–11)
WBC: 3.8 10^3/UL (ref 4.3–11)
WBC: 3.9 10^3/UL (ref 4.3–11)
WBC: 4.2 10^3/UL (ref 4.3–11)
WBC: 4.2 10^3/UL (ref 4.3–11)
WBC: 4.9 10^3/UL (ref 4.3–11)
WBC: 5.8 10^3/UL (ref 4.3–11)
WBC: 5.9 10^3/UL (ref 4.3–11)
WBC: 6.2 10^3/UL (ref 4.3–11)
WBC: 6.9 10^3/UL (ref 4.3–11)
WBC: 7.3 10^3/UL (ref 4.3–11)
WBC: 9.4 10^3/UL (ref 4.3–11)
WBC: ABNORMAL /HPF

## 2020-10-11 VITALS — SYSTOLIC BLOOD PRESSURE: 142 MMHG | WEIGHT: 220 LBS | DIASTOLIC BLOOD PRESSURE: 82 MMHG

## 2020-10-11 VITALS — WEIGHT: 214 LBS | SYSTOLIC BLOOD PRESSURE: 134 MMHG | DIASTOLIC BLOOD PRESSURE: 76 MMHG

## 2020-10-11 VITALS — DIASTOLIC BLOOD PRESSURE: 66 MMHG | SYSTOLIC BLOOD PRESSURE: 128 MMHG | WEIGHT: 210.01 LBS | WEIGHT: 211 LBS

## 2020-10-11 VITALS — WEIGHT: 181.59 LBS | DIASTOLIC BLOOD PRESSURE: 60 MMHG | SYSTOLIC BLOOD PRESSURE: 104 MMHG

## 2020-10-11 VITALS — WEIGHT: 193.19 LBS | DIASTOLIC BLOOD PRESSURE: 50 MMHG | SYSTOLIC BLOOD PRESSURE: 114 MMHG

## 2020-10-11 VITALS
DIASTOLIC BLOOD PRESSURE: 78 MMHG | BODY MASS INDEX: 37.36 KG/M2 | SYSTOLIC BLOOD PRESSURE: 132 MMHG | HEIGHT: 64 IN | WEIGHT: 218.81 LBS

## 2020-10-11 VITALS — WEIGHT: 202.01 LBS

## 2020-10-11 VITALS — WEIGHT: 216.6 LBS | DIASTOLIC BLOOD PRESSURE: 84 MMHG | SYSTOLIC BLOOD PRESSURE: 142 MMHG

## 2020-10-11 VITALS — DIASTOLIC BLOOD PRESSURE: 86 MMHG | WEIGHT: 220 LBS | SYSTOLIC BLOOD PRESSURE: 150 MMHG

## 2020-10-11 VITALS — DIASTOLIC BLOOD PRESSURE: 80 MMHG | SYSTOLIC BLOOD PRESSURE: 148 MMHG | WEIGHT: 211.6 LBS

## 2020-10-11 VITALS — SYSTOLIC BLOOD PRESSURE: 134 MMHG | WEIGHT: 210.61 LBS | DIASTOLIC BLOOD PRESSURE: 74 MMHG

## 2020-10-11 VITALS — DIASTOLIC BLOOD PRESSURE: 68 MMHG | WEIGHT: 201.61 LBS | SYSTOLIC BLOOD PRESSURE: 126 MMHG

## 2020-10-11 VITALS — DIASTOLIC BLOOD PRESSURE: 72 MMHG | SYSTOLIC BLOOD PRESSURE: 132 MMHG | WEIGHT: 198 LBS

## 2020-10-11 VITALS — WEIGHT: 201.39 LBS | DIASTOLIC BLOOD PRESSURE: 74 MMHG | SYSTOLIC BLOOD PRESSURE: 132 MMHG

## 2020-10-11 VITALS — WEIGHT: 216.01 LBS

## 2020-10-11 VITALS — WEIGHT: 210.01 LBS

## 2020-10-11 VITALS — WEIGHT: 192 LBS | SYSTOLIC BLOOD PRESSURE: 86 MMHG | DIASTOLIC BLOOD PRESSURE: 54 MMHG | WEIGHT: 185.6 LBS

## 2020-10-11 VITALS — WEIGHT: 210.21 LBS | SYSTOLIC BLOOD PRESSURE: 142 MMHG | DIASTOLIC BLOOD PRESSURE: 82 MMHG

## 2020-10-11 VITALS — WEIGHT: 181.99 LBS

## 2020-10-11 VITALS — WEIGHT: 217.99 LBS

## 2020-11-11 NOTE — H&P
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 Disp:  Rfl:  1/24/2017 at 0900   Pantoprazole Sodium (PROTONIX) 40 MG Oral Tab EC Take 1 tablet by mouth 2 (two) times daily before meals.  Disp: 60 tablet Rfl: 3 Unknown at Unknown time   Everolimus (AFINITOR) 10 MG Oral Tab Take 10 mg by mout 0.89 01/24/2017   BUN 11 01/24/2017    01/24/2017   K 3.8 01/24/2017    01/24/2017   CO2 24.0 01/24/2017    01/24/2017   CA 8.4 01/24/2017   ALB 3.2 01/23/2017   ALKPHO 58 01/23/2017   BILT 0.5 01/23/2017   TP 6.6 01/23/2017   AST 42 01/ of the transverse aorta. CHEST WALL:  There is a left-sided Port-A-Cath with tip in the SVC. Status post right mastectomy. LIMITED ABDOMEN:  Small hiatal hernia.  BONES:  Again noted are osteoblastic changes with some kyphosis involving the lower cervical t STRUCTURES:  Diffuse low T1 and elevated T2 signal with enhancement throughout the thoracic spine consistent with metastatic disease. The extent of metastatic disease is not changed since previous study. There is no extraosseous extension of tumor.  The inf some epidural enhancement seen at L5-S1 and posterior to the S1 vertebral body within the ventral epidural space that likely statistically stable epidural extension of tumor.  It is possible this is slightly improved but this could be related to contrast james information has been entered into a reminder system with a target due date for the next mammogram.    Dictated by: Adams Allen MD on 12/29/2016 at 13:51     Approved by: Adams Allen MD            Xr Chest Ap Portable  (cpt=71010)    1/23/2017  PROC

## (undated) NOTE — ED AVS SNAPSHOT
[facility] Emergency Department    [place]  [address] [address]    Phone:  [phone]    Fax:  Lev   MRN: [id number]    Department:  [facility] Emergency Department   Date of Visit:  4/ IF THERE IS ANY CHANGE OR WORSENING OF YOUR CONDITION, CALL YOUR PRIMARY CARE PHYSICIAN AT ONCE OR RETURN IMMEDIATELY TO THE EMERGENCY DEPARTMENT.     If you have been prescribed any medication(s), please fill your prescription right away and begin taking t

## (undated) NOTE — IP AVS SNAPSHOT
[facility] [place] [address] [place], [address] ~ [phone]                Discharge Summary   6/19/2017    [facility]           Admission Information        Provider Department    6/19/2017 Kati Ellison MD  4nw-A Commonly known as:  COGENTIN        Take 1 mg by mouth daily as needed. [    ]    [    ]    [    ]    [    ]       DOCEtaxel 20 MG/2ML Soln        Inject into the vein.       [    ]    [    ]    [    ]    [    ]       haloperidol 5 MG Tabs   Commonly k - [address] [phone], [facility]     Phone:  [phone]    - azithromycin 250 MG Tabs      Please  your prescriptions at the location [facility] [place] Recent Hematology Lab Results (cont.)  (Last 3 results in the past 90 days)    Neutrophil % Lymphocyte % Monocyte % Eosinophil % Basophil % Prelim Neut Abs Final Neut Abs Lymphocyte Abso Monocyte Absolu Eosinophil Abso Basophil Absolu    (06/20/17)  58.2 ( can help with your Affordable Care Act coverage, as well as all types of Medicaid plans. To get signed up and covered, please call [phone] and ask to get set up for an insurance coverage that is in-network with Jeny Kaye acetaminophen (TYLENOL EXTRA STRENGTH) 500 MG Oral Tab       Use: Treat pain, fever, inflammation   Most common side effects: Stomach upset   What to report to your healthcare team: Stomach upset, unresolved pain           GI Medications     Pantoprazole What to report to your healthcare team: Problems staying awake, confusion, memory problems             All Other Medications     DOCEtaxel 20 MG/2ML Intravenous Solution    Multiple Vitamins-Minerals (MULTI-VITAMIN/MINERALS) Oral Tab    Vitamin D3 (VITAMIN

## (undated) NOTE — ED AVS SNAPSHOT
[facility] Emergency Department    [place]  [address] [address]    Phone:  [phone]    Fax:  Lev   MRN: [id number]    Department:  [facility] Emergency Department   Date of Visit:  4/ self-assessment the day after your visit. You may also receive a call from our patient liason soon after your visit. Also, some patients receive a detailed feedback survey mailed to them a week after the visit.   If you receive this, we would really apprec [address] [phone] [address] [facility] [id number] [address] ([address]) [address] [place] [address] of the vascular structures, Doppler spectral analysis, and color flow. Doppler imaging were performed.   The   following veins were imaged:  Common, deep, and superficial femoral, popliteal, sapheno-femoral junction, posterior tibial veins, and the contral

## (undated) NOTE — IP AVS SNAPSHOT
[facility] [place] [address] [place], [address] ~ [phone]                Discharge Summary   1/23/2017    [facility]           Admission Information        Provider Department    1/23/2017 Bright Flores MD  5nw-A Take 10 mg by mouth nightly. Take with exemestane   Indications: Neuroendocrine Tumor of Pancreatic Origin                            Benztropine Mesylate 1 MG Tabs   Commonly known as:  COGENTIN        Take 1 mg by mouth daily as needed. Take warfarin 10mg today, then resume 10mg on MWF and 7.5mg on TuThSatSun.  I will check INR on Monday.   To get blood work checked on [address]     Discharge References/Attachments     ADULT, PNEUMONIA (ENGLISH)    THORACENTESIS, DISCHARGE INSTRUCTIONS FOR (E 3.34 (L) (01/26/17)  9.4 (L) (01/26/17)  28.4 (L) (01/26/17)  85.0 (01/26/17)  28.1 (01/26/17)  33.1 -- (01/26/17)  440.0 --    (01/25/17)  9.5 (01/25/17)  3.22 (L) (01/25/17)  9.0 (L) (01/25/17)  27.2 (L) (01/25/17)  84.5 (01/25/17)  28.0 (01/25/17)  33. 1 TOTAL PROTEIN PLEURAL FLUID In process    BLOOD CULTURE Preliminary result    BLOOD CULTURE Preliminary result    BODY FLUID CULTURE, STERILE Preliminary result      Radiology Exams     None      Patient Belongings       Most Recent Value    All belonging Use: Treat abnormal blood pressure (high or low), cardiac conditions; and/or abnormal heart rates/rhythms   Most common side effects: Dizziness or feeling lightheaded (especially with standing), heart rate changes, headaches, nausea/vomiting   What to repo What to report to your healthcare team:  Changes in thinking, confusion, skin swelling, palpitations, dizziness           General Nerve Function Medications     Benztropine Mesylate (COGENTIN) 1 MG Oral Tab       Use:  Treat conditions such as seizures, he